# Patient Record
Sex: MALE | Race: WHITE | NOT HISPANIC OR LATINO | Employment: FULL TIME | URBAN - METROPOLITAN AREA
[De-identification: names, ages, dates, MRNs, and addresses within clinical notes are randomized per-mention and may not be internally consistent; named-entity substitution may affect disease eponyms.]

---

## 2024-02-19 ENCOUNTER — HOSPITAL ENCOUNTER (INPATIENT)
Facility: HOSPITAL | Age: 64
LOS: 3 days | Discharge: HOME | End: 2024-02-22
Attending: EMERGENCY MEDICINE | Admitting: INTERNAL MEDICINE
Payer: COMMERCIAL

## 2024-02-19 ENCOUNTER — APPOINTMENT (OUTPATIENT)
Dept: GENERAL RADIOLOGY | Facility: HOSPITAL | Age: 64
End: 2024-02-19
Payer: COMMERCIAL

## 2024-02-19 ENCOUNTER — APPOINTMENT (OUTPATIENT)
Dept: CT IMAGING | Facility: HOSPITAL | Age: 64
End: 2024-02-19
Payer: COMMERCIAL

## 2024-02-19 DIAGNOSIS — I21.4 NSTEMI (NON-ST ELEVATED MYOCARDIAL INFARCTION): Primary | ICD-10-CM

## 2024-02-19 PROBLEM — I20.0 UNSTABLE ANGINA: Status: ACTIVE | Noted: 2024-02-19

## 2024-02-19 PROBLEM — R07.9 CHEST PAIN: Status: ACTIVE | Noted: 2024-02-19

## 2024-02-19 PROBLEM — R73.9 HYPERGLYCEMIA: Status: ACTIVE | Noted: 2024-02-19

## 2024-02-19 PROBLEM — I10 HYPERTENSION: Status: ACTIVE | Noted: 2024-02-19

## 2024-02-19 PROBLEM — E87.1 HYPONATREMIA: Status: ACTIVE | Noted: 2024-02-19

## 2024-02-19 PROBLEM — E78.5 HYPERLIPIDEMIA: Status: ACTIVE | Noted: 2024-02-19

## 2024-02-19 LAB
ALBUMIN SERPL-MCNC: 4.5 G/DL (ref 3.5–5.2)
ALBUMIN/GLOB SERPL: 1.3 G/DL
ALP SERPL-CCNC: 81 U/L (ref 39–117)
ALT SERPL W P-5'-P-CCNC: 35 U/L (ref 1–41)
ANION GAP SERPL CALCULATED.3IONS-SCNC: 12 MMOL/L (ref 5–15)
APTT PPP: 30.7 SECONDS (ref 60–90)
AST SERPL-CCNC: 40 U/L (ref 1–40)
BASOPHILS # BLD AUTO: 0.04 10*3/MM3 (ref 0–0.2)
BASOPHILS NFR BLD AUTO: 0.5 % (ref 0–1.5)
BILIRUB SERPL-MCNC: 1.7 MG/DL (ref 0–1.2)
BUN SERPL-MCNC: 13 MG/DL (ref 8–23)
BUN/CREAT SERPL: 14.9 (ref 7–25)
CALCIUM SPEC-SCNC: 9.7 MG/DL (ref 8.6–10.5)
CHLORIDE SERPL-SCNC: 99 MMOL/L (ref 98–107)
CO2 SERPL-SCNC: 23 MMOL/L (ref 22–29)
CREAT SERPL-MCNC: 0.87 MG/DL (ref 0.76–1.27)
D DIMER PPP FEU-MCNC: <0.27 MCGFEU/ML (ref 0–0.63)
D-LACTATE SERPL-SCNC: 1.5 MMOL/L (ref 0.5–2)
DEPRECATED RDW RBC AUTO: 40.2 FL (ref 37–54)
EGFRCR SERPLBLD CKD-EPI 2021: 97 ML/MIN/1.73
EOSINOPHIL # BLD AUTO: 0.11 10*3/MM3 (ref 0–0.4)
EOSINOPHIL NFR BLD AUTO: 1.3 % (ref 0.3–6.2)
ERYTHROCYTE [DISTWIDTH] IN BLOOD BY AUTOMATED COUNT: 12.8 % (ref 12.3–15.4)
GEN 5 2HR TROPONIN T REFLEX: 1331 NG/L
GLOBULIN UR ELPH-MCNC: 3.4 GM/DL
GLUCOSE SERPL-MCNC: 136 MG/DL (ref 65–99)
HCT VFR BLD AUTO: 52.6 % (ref 37.5–51)
HGB BLD-MCNC: 18.5 G/DL (ref 13–17.7)
HOLD SPECIMEN: NORMAL
IMM GRANULOCYTES # BLD AUTO: 0.04 10*3/MM3 (ref 0–0.05)
IMM GRANULOCYTES NFR BLD AUTO: 0.5 % (ref 0–0.5)
INR PPP: 1.04 (ref 0.89–1.12)
LIPASE SERPL-CCNC: 20 U/L (ref 13–60)
LYMPHOCYTES # BLD AUTO: 1.98 10*3/MM3 (ref 0.7–3.1)
LYMPHOCYTES NFR BLD AUTO: 23.9 % (ref 19.6–45.3)
MCH RBC QN AUTO: 30.5 PG (ref 26.6–33)
MCHC RBC AUTO-ENTMCNC: 35.2 G/DL (ref 31.5–35.7)
MCV RBC AUTO: 86.7 FL (ref 79–97)
MONOCYTES # BLD AUTO: 0.75 10*3/MM3 (ref 0.1–0.9)
MONOCYTES NFR BLD AUTO: 9.1 % (ref 5–12)
NEUTROPHILS NFR BLD AUTO: 5.36 10*3/MM3 (ref 1.7–7)
NEUTROPHILS NFR BLD AUTO: 64.7 % (ref 42.7–76)
NRBC BLD AUTO-RTO: 0 /100 WBC (ref 0–0.2)
NT-PROBNP SERPL-MCNC: 788.3 PG/ML (ref 0–900)
PLATELET # BLD AUTO: 269 10*3/MM3 (ref 140–450)
PMV BLD AUTO: 10.1 FL (ref 6–12)
POTASSIUM SERPL-SCNC: 3.5 MMOL/L (ref 3.5–5.2)
PROCALCITONIN SERPL-MCNC: 0.11 NG/ML (ref 0–0.25)
PROT SERPL-MCNC: 7.9 G/DL (ref 6–8.5)
PROTHROMBIN TIME: 13.7 SECONDS (ref 12.2–14.5)
RBC # BLD AUTO: 6.07 10*6/MM3 (ref 4.14–5.8)
SODIUM SERPL-SCNC: 134 MMOL/L (ref 136–145)
TROPONIN T DELTA: 111 NG/L
TROPONIN T SERPL HS-MCNC: 1220 NG/L
UFH PPP CHRO-ACNC: 0.1 IU/ML (ref 0.3–0.7)
WBC NRBC COR # BLD AUTO: 8.28 10*3/MM3 (ref 3.4–10.8)
WHOLE BLOOD HOLD COAG: NORMAL
WHOLE BLOOD HOLD SPECIMEN: NORMAL

## 2024-02-19 PROCEDURE — 83690 ASSAY OF LIPASE: CPT | Performed by: EMERGENCY MEDICINE

## 2024-02-19 PROCEDURE — 85025 COMPLETE CBC W/AUTO DIFF WBC: CPT

## 2024-02-19 PROCEDURE — 83880 ASSAY OF NATRIURETIC PEPTIDE: CPT | Performed by: EMERGENCY MEDICINE

## 2024-02-19 PROCEDURE — 25010000002 HEPARIN (PORCINE) 25000-0.45 UT/250ML-% SOLUTION: Performed by: EMERGENCY MEDICINE

## 2024-02-19 PROCEDURE — 85379 FIBRIN DEGRADATION QUANT: CPT | Performed by: EMERGENCY MEDICINE

## 2024-02-19 PROCEDURE — 93010 ELECTROCARDIOGRAM REPORT: CPT | Performed by: INTERNAL MEDICINE

## 2024-02-19 PROCEDURE — 84484 ASSAY OF TROPONIN QUANT: CPT | Performed by: EMERGENCY MEDICINE

## 2024-02-19 PROCEDURE — 25010000002 MORPHINE PER 10 MG: Performed by: NURSE PRACTITIONER

## 2024-02-19 PROCEDURE — 93005 ELECTROCARDIOGRAM TRACING: CPT | Performed by: NURSE PRACTITIONER

## 2024-02-19 PROCEDURE — G0378 HOSPITAL OBSERVATION PER HR: HCPCS

## 2024-02-19 PROCEDURE — 83605 ASSAY OF LACTIC ACID: CPT | Performed by: INTERNAL MEDICINE

## 2024-02-19 PROCEDURE — 85520 HEPARIN ASSAY: CPT | Performed by: EMERGENCY MEDICINE

## 2024-02-19 PROCEDURE — 93005 ELECTROCARDIOGRAM TRACING: CPT | Performed by: EMERGENCY MEDICINE

## 2024-02-19 PROCEDURE — 85610 PROTHROMBIN TIME: CPT | Performed by: EMERGENCY MEDICINE

## 2024-02-19 PROCEDURE — 25010000002 HEPARIN (PORCINE) PER 1000 UNITS: Performed by: EMERGENCY MEDICINE

## 2024-02-19 PROCEDURE — 99223 1ST HOSP IP/OBS HIGH 75: CPT | Performed by: INTERNAL MEDICINE

## 2024-02-19 PROCEDURE — 25010000002 NITROGLYCERIN 200 MCG/ML SOLUTION: Performed by: INTERNAL MEDICINE

## 2024-02-19 PROCEDURE — 84145 PROCALCITONIN (PCT): CPT | Performed by: INTERNAL MEDICINE

## 2024-02-19 PROCEDURE — 93005 ELECTROCARDIOGRAM TRACING: CPT

## 2024-02-19 PROCEDURE — 25810000003 SODIUM CHLORIDE 0.9 % SOLUTION: Performed by: NURSE PRACTITIONER

## 2024-02-19 PROCEDURE — 71250 CT THORAX DX C-: CPT

## 2024-02-19 PROCEDURE — 85730 THROMBOPLASTIN TIME PARTIAL: CPT | Performed by: EMERGENCY MEDICINE

## 2024-02-19 PROCEDURE — 80053 COMPREHEN METABOLIC PANEL: CPT | Performed by: EMERGENCY MEDICINE

## 2024-02-19 PROCEDURE — 99291 CRITICAL CARE FIRST HOUR: CPT

## 2024-02-19 PROCEDURE — 71045 X-RAY EXAM CHEST 1 VIEW: CPT

## 2024-02-19 RX ORDER — BISACODYL 10 MG
10 SUPPOSITORY, RECTAL RECTAL DAILY PRN
Status: DISCONTINUED | OUTPATIENT
Start: 2024-02-19 | End: 2024-02-22 | Stop reason: HOSPADM

## 2024-02-19 RX ORDER — BISACODYL 5 MG/1
5 TABLET, DELAYED RELEASE ORAL DAILY PRN
Status: DISCONTINUED | OUTPATIENT
Start: 2024-02-19 | End: 2024-02-22 | Stop reason: HOSPADM

## 2024-02-19 RX ORDER — HEPARIN SODIUM 1000 [USP'U]/ML
4000 INJECTION, SOLUTION INTRAVENOUS; SUBCUTANEOUS AS NEEDED
Status: DISCONTINUED | OUTPATIENT
Start: 2024-02-19 | End: 2024-02-19

## 2024-02-19 RX ORDER — MORPHINE SULFATE 2 MG/ML
1 INJECTION, SOLUTION INTRAMUSCULAR; INTRAVENOUS EVERY 4 HOURS PRN
Status: DISCONTINUED | OUTPATIENT
Start: 2024-02-19 | End: 2024-02-20

## 2024-02-19 RX ORDER — LIDOCAINE HYDROCHLORIDE 20 MG/ML
5 SOLUTION OROPHARYNGEAL ONCE
Status: COMPLETED | OUTPATIENT
Start: 2024-02-19 | End: 2024-02-19

## 2024-02-19 RX ORDER — SODIUM CHLORIDE 0.9 % (FLUSH) 0.9 %
10 SYRINGE (ML) INJECTION AS NEEDED
Status: DISCONTINUED | OUTPATIENT
Start: 2024-02-19 | End: 2024-02-22 | Stop reason: HOSPADM

## 2024-02-19 RX ORDER — ACETAMINOPHEN 325 MG/1
650 TABLET ORAL EVERY 4 HOURS PRN
Status: DISCONTINUED | OUTPATIENT
Start: 2024-02-19 | End: 2024-02-21 | Stop reason: SDUPTHER

## 2024-02-19 RX ORDER — ACETAMINOPHEN 650 MG/1
650 SUPPOSITORY RECTAL EVERY 4 HOURS PRN
Status: DISCONTINUED | OUTPATIENT
Start: 2024-02-19 | End: 2024-02-21 | Stop reason: SDUPTHER

## 2024-02-19 RX ORDER — NITROGLYCERIN 0.4 MG/1
0.4 TABLET SUBLINGUAL
Status: DISCONTINUED | OUTPATIENT
Start: 2024-02-19 | End: 2024-02-19

## 2024-02-19 RX ORDER — ACETAMINOPHEN 160 MG/5ML
650 SOLUTION ORAL EVERY 4 HOURS PRN
Status: DISCONTINUED | OUTPATIENT
Start: 2024-02-19 | End: 2024-02-21 | Stop reason: SDUPTHER

## 2024-02-19 RX ORDER — POTASSIUM CHLORIDE 20 MEQ/1
40 TABLET, EXTENDED RELEASE ORAL EVERY 4 HOURS
Status: DISCONTINUED | OUTPATIENT
Start: 2024-02-19 | End: 2024-02-20

## 2024-02-19 RX ORDER — HEPARIN SODIUM 10000 [USP'U]/100ML
16 INJECTION, SOLUTION INTRAVENOUS
Status: DISCONTINUED | OUTPATIENT
Start: 2024-02-19 | End: 2024-02-20

## 2024-02-19 RX ORDER — ASPIRIN 81 MG/1
324 TABLET, CHEWABLE ORAL ONCE
Status: DISCONTINUED | OUTPATIENT
Start: 2024-02-19 | End: 2024-02-19 | Stop reason: SDUPTHER

## 2024-02-19 RX ORDER — NITROGLYCERIN 20 MG/100ML
5-200 INJECTION INTRAVENOUS
Status: DISCONTINUED | OUTPATIENT
Start: 2024-02-19 | End: 2024-02-21

## 2024-02-19 RX ORDER — ATORVASTATIN CALCIUM 40 MG/1
40 TABLET, FILM COATED ORAL NIGHTLY
Status: DISCONTINUED | OUTPATIENT
Start: 2024-02-19 | End: 2024-02-22 | Stop reason: HOSPADM

## 2024-02-19 RX ORDER — POLYETHYLENE GLYCOL 3350 17 G/17G
17 POWDER, FOR SOLUTION ORAL DAILY PRN
Status: DISCONTINUED | OUTPATIENT
Start: 2024-02-19 | End: 2024-02-22 | Stop reason: HOSPADM

## 2024-02-19 RX ORDER — AMOXICILLIN 250 MG
2 CAPSULE ORAL 2 TIMES DAILY PRN
Status: DISCONTINUED | OUTPATIENT
Start: 2024-02-19 | End: 2024-02-22 | Stop reason: HOSPADM

## 2024-02-19 RX ORDER — PANTOPRAZOLE SODIUM 40 MG/10ML
40 INJECTION, POWDER, LYOPHILIZED, FOR SOLUTION INTRAVENOUS EVERY 12 HOURS SCHEDULED
Status: DISCONTINUED | OUTPATIENT
Start: 2024-02-19 | End: 2024-02-22 | Stop reason: ALTCHOICE

## 2024-02-19 RX ORDER — SODIUM CHLORIDE 9 MG/ML
75 INJECTION, SOLUTION INTRAVENOUS CONTINUOUS
Status: DISCONTINUED | OUTPATIENT
Start: 2024-02-19 | End: 2024-02-20

## 2024-02-19 RX ORDER — SODIUM CHLORIDE 0.9 % (FLUSH) 0.9 %
10 SYRINGE (ML) INJECTION EVERY 12 HOURS SCHEDULED
Status: DISCONTINUED | OUTPATIENT
Start: 2024-02-19 | End: 2024-02-22 | Stop reason: HOSPADM

## 2024-02-19 RX ORDER — CHOLECALCIFEROL (VITAMIN D3) 125 MCG
10 CAPSULE ORAL NIGHTLY PRN
Status: DISCONTINUED | OUTPATIENT
Start: 2024-02-19 | End: 2024-02-22 | Stop reason: HOSPADM

## 2024-02-19 RX ORDER — SODIUM CHLORIDE 9 MG/ML
40 INJECTION, SOLUTION INTRAVENOUS AS NEEDED
Status: DISCONTINUED | OUTPATIENT
Start: 2024-02-19 | End: 2024-02-22 | Stop reason: HOSPADM

## 2024-02-19 RX ORDER — ASPIRIN 81 MG/1
324 TABLET, CHEWABLE ORAL ONCE
Status: DISCONTINUED | OUTPATIENT
Start: 2024-02-19 | End: 2024-02-22 | Stop reason: HOSPADM

## 2024-02-19 RX ORDER — NALOXONE HCL 0.4 MG/ML
0.4 VIAL (ML) INJECTION
Status: DISCONTINUED | OUTPATIENT
Start: 2024-02-19 | End: 2024-02-22 | Stop reason: HOSPADM

## 2024-02-19 RX ORDER — HEPARIN SODIUM 1000 [USP'U]/ML
2000 INJECTION, SOLUTION INTRAVENOUS; SUBCUTANEOUS AS NEEDED
Status: DISCONTINUED | OUTPATIENT
Start: 2024-02-19 | End: 2024-02-19

## 2024-02-19 RX ORDER — HEPARIN SODIUM 1000 [USP'U]/ML
4000 INJECTION, SOLUTION INTRAVENOUS; SUBCUTANEOUS ONCE
Qty: 10 ML | Refills: 0 | Status: COMPLETED | OUTPATIENT
Start: 2024-02-19 | End: 2024-02-19

## 2024-02-19 RX ADMIN — HEPARIN SODIUM 4000 UNITS: 1000 INJECTION INTRAVENOUS; SUBCUTANEOUS at 17:58

## 2024-02-19 RX ADMIN — NITROGLYCERIN 0.4 MG: 0.4 TABLET SUBLINGUAL at 20:55

## 2024-02-19 RX ADMIN — Medication 10 ML: at 22:33

## 2024-02-19 RX ADMIN — POTASSIUM CHLORIDE 40 MEQ: 1500 TABLET, EXTENDED RELEASE ORAL at 22:37

## 2024-02-19 RX ADMIN — HEPARIN SODIUM 11 UNITS/KG/HR: 10000 INJECTION, SOLUTION INTRAVENOUS at 17:58

## 2024-02-19 RX ADMIN — NITROGLYCERIN 10 MCG/MIN: 20 INJECTION INTRAVENOUS at 22:10

## 2024-02-19 RX ADMIN — LIDOCAINE HYDROCHLORIDE 5 ML: 20 SOLUTION ORAL at 22:37

## 2024-02-19 RX ADMIN — SODIUM CHLORIDE 75 ML/HR: 9 INJECTION, SOLUTION INTRAVENOUS at 21:33

## 2024-02-19 RX ADMIN — ATORVASTATIN CALCIUM 40 MG: 40 TABLET, FILM COATED ORAL at 22:37

## 2024-02-19 RX ADMIN — NITROGLYCERIN 0.4 MG: 0.4 TABLET SUBLINGUAL at 20:59

## 2024-02-19 RX ADMIN — NITROGLYCERIN 1 INCH: 20 OINTMENT TOPICAL at 21:18

## 2024-02-19 RX ADMIN — MORPHINE SULFATE 1 MG: 2 INJECTION, SOLUTION INTRAMUSCULAR; INTRAVENOUS at 21:19

## 2024-02-19 RX ADMIN — PANTOPRAZOLE SODIUM 40 MG: 40 INJECTION, POWDER, FOR SOLUTION INTRAVENOUS at 22:37

## 2024-02-19 RX ADMIN — NITROGLYCERIN 0.4 MG: 0.4 TABLET SUBLINGUAL at 20:49

## 2024-02-19 NOTE — Clinical Note
Hemostasis started on the right radial artery. R-Band was used in achieving hemostasis. Radial compression device applied to vessel. Hemostasis achieved successfully. Closure device additional comment: 12cc

## 2024-02-19 NOTE — Clinical Note
Hemostasis started on the right radial artery. R-Band was used in achieving hemostasis. Radial compression device applied to vessel. Hemostasis achieved successfully. Closure device additional comment: 16cc air

## 2024-02-19 NOTE — ED PROVIDER NOTES
Subjective   History of Present Illness  63-year-old male who presents for evaluation of chest pain.  The patient reports that last week he had a GI bug.  He had diarrhea and he reports that he had 1 really severe episode of nausea vomiting.  After the episode he had pain in the center of his chest.  However for the most part it is not severe.  He reports that yesterday when he ate some roast the pain became more severe and he reports today when he ate some toast the pain became more severe.  The pain is in the center of the chest but he does feel pain up into his teeth, and into his bilateral arms.  He reports that the pain was 6 out of 10 when he first presented here but now it is negligible.  He denies fever or infectious symptoms.  No runny nose cough congestion sore throat.  No abdominal pain.  No change in bowel or urinary function.  He does have a history of high cholesterol and has a BMI greater than 30.  No history of high blood pressure or diabetes.  He does not smoke.  No history of coronary artery disease in a first-degree relative less than 65.  He appears well nontoxic and in no acute distress.      Review of Systems   Constitutional:  Negative for chills, fatigue and fever.   HENT:  Negative for congestion, ear pain, postnasal drip, sinus pressure and sore throat.    Eyes:  Negative for pain, redness and visual disturbance.   Respiratory:  Negative for cough, chest tightness and shortness of breath.    Cardiovascular:  Positive for chest pain. Negative for palpitations and leg swelling.   Gastrointestinal:  Negative for abdominal pain, anal bleeding, blood in stool, diarrhea, nausea and vomiting.   Endocrine: Negative for polydipsia and polyuria.   Genitourinary:  Negative for difficulty urinating, dysuria, frequency and urgency.   Musculoskeletal:  Negative for arthralgias, back pain and neck pain.   Skin:  Negative for pallor and rash.   Allergic/Immunologic: Negative for environmental allergies and  immunocompromised state.   Neurological:  Negative for dizziness, weakness and headaches.   Hematological:  Negative for adenopathy.   Psychiatric/Behavioral:  Negative for confusion, self-injury and suicidal ideas. The patient is not nervous/anxious.    All other systems reviewed and are negative.      Past Medical History:   Diagnosis Date    Hyperlipidemia     Hypertension        No Known Allergies    Past Surgical History:   Procedure Laterality Date    CARDIAC CATHETERIZATION N/A 2/20/2024    Procedure: Left Heart Cath;  Surgeon: Braeden Alcantara MD;  Location:  [facility];  Service: Cardiovascular;  Laterality: N/A;    CARDIAC CATHETERIZATION  2/20/2024    Procedure: Percutaneous Manual Thrombectomy;  Surgeon: Braeden Alcantara MD;  Location:  [facility];  Service: Cardiovascular;;    CARDIAC CATHETERIZATION N/A 2/21/2024    Procedure: Percutaneous Coronary Intervention - PCI to RCA;  Surgeon: Jerad Keller III, MD;  Location:  [facility];  Service: Cardiology;  Laterality: N/A;    EYE SURGERY      VASECTOMY         Family History   Problem Relation Age of Onset    Heart disease Mother     Thyroid disease Mother     Dementia Father        Social History     Socioeconomic History    Marital status:    Tobacco Use    Smoking status: Former     Types: Cigarettes    Smokeless tobacco: Never   Vaping Use    Vaping Use: Never used   Substance and Sexual Activity    Alcohol use: Yes     Comment: wine once a week    Drug use: Yes     Types: Marijuana     Comment: weekends    Sexual activity: Defer           Objective   Physical Exam  Vitals and nursing note reviewed.   Constitutional:       General: He is not in acute distress.     Appearance: Normal appearance. He is well-developed. He is not toxic-appearing or diaphoretic.   HENT:      Head: Normocephalic and atraumatic.      Right Ear: External ear normal.      Left Ear: External ear normal.      Nose:  Nose normal.   Eyes:      General: Lids are normal.      Pupils: Pupils are equal, round, and reactive to light.   Neck:      Trachea: No tracheal deviation.   Cardiovascular:      Rate and Rhythm: Regular rhythm. Tachycardia present.      Pulses: No decreased pulses.      Heart sounds: Normal heart sounds. No murmur heard.     No friction rub. No gallop.   Pulmonary:      Effort: Pulmonary effort is normal. No respiratory distress.      Breath sounds: Normal breath sounds. No decreased breath sounds, wheezing, rhonchi or rales.   Abdominal:      General: Bowel sounds are normal.      Palpations: Abdomen is soft.      Tenderness: There is no abdominal tenderness. There is no guarding or rebound.   Musculoskeletal:         General: No deformity. Normal range of motion.      Cervical back: Normal range of motion and neck supple.   Lymphadenopathy:      Cervical: No cervical adenopathy.   Skin:     General: Skin is warm and dry.      Findings: No rash.   Neurological:      Mental Status: He is alert and oriented to person, place, and time.      Cranial Nerves: No cranial nerve deficit.      Sensory: No sensory deficit.   Psychiatric:         Speech: Speech normal.         Behavior: Behavior normal.         Thought Content: Thought content normal.         Judgment: Judgment normal.         Critical Care    Performed by: Addison aGrcia MD  Authorized by: Addison Garcia MD    Critical care provider statement:     Critical care time (minutes):  45    Critical care time was exclusive of:  Separately billable procedures and treating other patients    Critical care was necessary to treat or prevent imminent or life-threatening deterioration of the following conditions:  Cardiac failure    Critical care was time spent personally by me on the following activities:  Development of treatment plan with patient or surrogate, discussions with primary provider, evaluation of patient's response to treatment, examination  of patient, obtaining history from patient or surrogate, ordering and review of laboratory studies, ordering and review of radiographic studies, ordering and performing treatments and interventions, re-evaluation of patient's condition, review of old charts and pulse oximetry    I assumed direction of critical care for this patient from another provider in my specialty: no      Care discussed with: admitting provider               ED Course  ED Course as of 02/23/24 1316   Mon Feb 19, 2024   1717 The patient reports having a GI bug last week.  After an episode of severe vomiting he began to develop pain in the chest.  He reports that the pain is minimal most the time but with eating yesterday and the day he had increase in pain.  The pain radiates into the jaw and into the bilateral shoulders and arms.  No history of coronary artery disease.  He does have high cholesterol and is obese.  Troponin is dramatically elevated.  Unsure if this is a secondary viral myocarditis.  EKG does show subtle ST depression in high lateral leads.  Discussed the patient with Dr. Hansen, who recommends non-STEMI medications but do not give Plavix.  He recommends n.p.o. after midnight for cath tomorrow.  The patient states that his chest pain is negligible at this given time.  He has been walking throughout the ER appearing well since arrival.  [NS]      ED Course User Index  [NS] Addison Garcia MD                                               Medical Decision Making  Differential diagnosis includes viral myocarditis, pulmonary embolism, dehydration, acute coronary syndrome, other unspecified etiology.    Troponin is significant elevated.    EKG does show subtle ST depressions in lead I and aVL.  No other significant changes.  No history of coronary artery disease.    D-dimer pending.  Chest x-ray pending.  Lungs clear to auscultation diffusely.  Normal oxygen saturations.  No history of DVT or PE.  No signs of DVT on  exam.    Troponin is significant elevated greater than 1200.    I discussed the patient's presentation with the on-call cardiologist, Dr. Mcleod, who recommends non-STEMI medications except Plavix.    Discussed the patient with the hospitalist, Dr. Correia, who will consult on the patient to determine status of admission.    Problems Addressed:  NSTEMI (non-ST elevated myocardial infarction): complicated acute illness or injury    Amount and/or Complexity of Data Reviewed  Independent Historian: spouse     Details: Wife provides additional information.  External Data Reviewed: labs, radiology and ECG.  Labs: ordered. Decision-making details documented in ED Course.  Radiology: ordered and independent interpretation performed. Decision-making details documented in ED Course.  ECG/medicine tests: ordered and independent interpretation performed. Decision-making details documented in ED Course.    Risk  OTC drugs.  Prescription drug management.  Decision regarding hospitalization.        Final diagnoses:   NSTEMI (non-ST elevated myocardial infarction)       ED Disposition  ED Disposition       ED Disposition   Decision to Admit    Condition   --    Comment   Level of Care: Telemetry [5]   Diagnosis: Chest pain [489264]   Admitting Physician: JONNY CORREIA [[id number]]   Attending Physician: JONNY CORREIA [[id number]]                 Liya Rubio, APRN  [address] [address]  [phone]      1 week         Medication List        New Prescriptions      aspirin 81 MG EC tablet  Take 1 tablet by mouth Daily.     bisoprolol 5 MG tablet  Commonly known as: ZEBeta  Take 0.5 tablets by mouth Daily.     ticagrelor 90 MG tablet tablet  Commonly known as: BRILINTA  Take 1 tablet by mouth Every 12 (Twelve) Hours.            Changed      atorvastatin 40 MG tablet  Commonly known as: LIPITOR  Take 1 tablet by mouth Every Night.  What changed:   medication strength  how much to take  when to take this                Where to Get Your Medications        These medications were sent to [id number] - [place], [place] - [address] AT [facility] [address] - [phone]  - [phone] FX  [address], [facility] [address]      Phone: [phone]   atorvastatin 40 MG tablet  bisoprolol 5 MG tablet  ticagrelor 90 MG tablet tablet       Information about where to get these medications is not yet available    Ask your nurse or doctor about these medications  aspirin 81 MG EC tablet            Addison Garcia MD  02/23/24 0084

## 2024-02-19 NOTE — Clinical Note
Blood pressure Goal of <140/90 met? yes  Labs reviewed/ordered  Low Salt Intake encouraged.   Exercise as tolerated  Continue medications  Patient is understanding of the plan  Follow-up as recommended in progress note     The right coronary artery was selectively engaged, injected and visualized.

## 2024-02-19 NOTE — Clinical Note
Level of Care: Telemetry [5]   Diagnosis: Chest pain [452252]   Admitting Physician: JONNY CORREIA [[id number]]   Attending Physician: JONNY CORREIA [[id number]]

## 2024-02-19 NOTE — Clinical Note
First balloon inflation max pressure = 14 sonal. First balloon inflation duration = 15 seconds. Second inflation of balloon - Max pressure = 14 sonal. 2nd Inflation of balloon - Duration = 14 seconds. 2nd inflation was done at 14:47 EST. The balloon is positioned in the Mid segment of the vessel. Third inflation of balloon - Max pressure = 14 sonal. 3rd Inflation of balloon - Duration = 13 seconds. 3rd inflation was done at 14:48 EST. The bal loon is positioned in the Distal segment of the vessel.

## 2024-02-19 NOTE — ED NOTES
" Taqueria Madden    Nursing Report ED to Floor:  Mental status: A&Ox4  Ambulatory status: up with standby  Oxygen Therapy:  room air  Cardiac Rhythm: normal sinus  Admitted from: home  Safety Concerns:  none  Social Issues: none  ED Room #:  23    ED Nurse Phone Extension - [phone] or may call [phone].      HPI:   Chief Complaint   Patient presents with    Chest Pain       Past Medical History:  Past Medical History:   Diagnosis Date    Hyperlipidemia     Hypertension         Past Surgical History:  No past surgical history on file.     Admitting Doctor:   Glendy Cisse MD    Consulting Provider(s):  Consults       No orders found from 1/21/2024 to 2/20/2024.             Admitting Diagnosis:   The encounter diagnosis was NSTEMI (non-ST elevated myocardial infarction).    Most Recent Vitals:   Vitals:    02/19/24 1547   BP: (!) 175/121   BP Location: Right arm   Patient Position: Sitting   Pulse: 110   Resp: 20   Temp: 98.2 °F (36.8 °C)   TempSrc: Oral   SpO2: 96%   Weight: 95.3 kg (210 lb)   Height: 177.8 cm (70\")       Active LDAs/IV Access:   Lines, Drains & Airways       Active LDAs       Name Placement date Placement time Site Days    Peripheral IV 02/19/24 1608 Anterior;Left;Proximal Forearm 02/19/24  1608  Forearm  less than 1                    Labs (abnormal labs have a star):   Labs Reviewed   TROPONIN - Abnormal; Notable for the following components:       Result Value    HS Troponin T 1,220 (*)     All other components within normal limits    Narrative:     High Sensitive Troponin T Reference Range:  <14.0 ng/L- Negative Female for AMI  <22.0 ng/L- Negative Male for AMI  >=14 - Abnormal Female indicating possible myocardial injury.  >=22 - Abnormal Male indicating possible myocardial injury.   Clinicians would have to utilize clinical acumen, EKG, Troponin, and serial changes to determine if it is an Acute Myocardial Infarction or myocardial injury due to an underlying chronic condition.        COMPREHENSIVE " METABOLIC PANEL - Abnormal; Notable for the following components:    Glucose 136 (*)     Sodium 134 (*)     Total Bilirubin 1.7 (*)     All other components within normal limits    Narrative:     GFR Normal >60  Chronic Kidney Disease <60  Kidney Failure <15     CBC WITH AUTO DIFFERENTIAL - Abnormal; Notable for the following components:    RBC 6.07 (*)     Hemoglobin 18.5 (*)     Hematocrit 52.6 (*)     All other components within normal limits   HEPARIN ANTI XA - Abnormal; Notable for the following components:    Heparin Anti-Xa (UFH) 0.10 (*)     All other components within normal limits   APTT - Abnormal; Notable for the following components:    PTT 30.7 (*)     All other components within normal limits    Narrative:     PTT = The equivalent PTT values for the therapeutic range of heparin levels at 0.3 to 0.5 U/ml are 60 to 70 seconds.   LIPASE - Normal   BNP (IN-HOUSE) - Normal    Narrative:     This assay is used as an aid in the diagnosis of individuals suspected of having heart failure. It can be used as an aid in the diagnosis of acute decompensated heart failure (ADHF) in patients presenting with signs and symptoms of ADHF to the emergency department (ED). In addition, NT-proBNP of <300 pg/mL indicates ADHF is not likely.    Age Range Result Interpretation  NT-proBNP Concentration (pg/mL:      <50             Positive            >450                   Gray                 300-450                    Negative             <300    50-75           Positive            >900                  Gray                300-900                  Negative            <300      >75             Positive            >1800                  Gray                300-1800                  Negative            <300   PROTIME-INR - Normal   D-DIMER, QUANTITATIVE - Normal    Narrative:     According to the assay 's published package insert, a normal (<0.50 MCGFEU/mL) D-dimer result in conjunction with a non-high clinical  "probability assessment, excludes deep vein thrombosis (DVT) and pulmonary embolism (PE) with high sensitivity.    D-dimer values increase with age and this can make VTE exclusion of an older population difficult. To address this, the American College [employer], based on best available evidence and recent guidelines, recommends that clinicians use age-adjusted D-dimer thresholds in patients greater than 50 years of age with: a) a low probability of PE who do not meet all Pulmonary Embolism Rule Out Criteria, or b) in those with intermediate probability of PE.   The formula for an age-adjusted D-dimer cut-off is \"age/100\".  For example, a 60 year old patient would have an age-adjusted cut-off of 0.60 MCGFEU/mL and an 80 year old 0.80 MCGFEU/mL.   LACTIC ACID, PLASMA - Normal   PROCALCITONIN - Normal    Narrative:     As a Marker for Sepsis (Non-Neonates):    1. <0.5 ng/mL represents a low risk of severe sepsis and/or septic shock.  2. >2 ng/mL represents a high risk of severe sepsis and/or septic shock.    As a Marker for Lower Respiratory Tract Infections that require antibiotic therapy:    PCT on Admission    Antibiotic Therapy       6-12 Hrs later    >0.5                Strongly Recommended  >0.25 - <0.5        Recommended   0.1 - 0.25          Discouraged              Remeasure/reassess PCT  <0.1                Strongly Discouraged     Remeasure/reassess PCT    As 28 day mortality risk marker: \"Change in Procalcitonin Result\" (>80% or <=80%) if Day 0 (or Day 1) and Day 4 values are available. Refer to http://www.[facility]-pct-calculator.com    Change in PCT <=80%  A decrease of PCT levels below or equal to 80% defines a positive change in PCT test result representing a higher risk for 28-day all-cause mortality of patients diagnosed with severe sepsis for septic shock.    Change in PCT >80%  A decrease of PCT levels of more than 80% defines a negative change in PCT result representing a lower risk for 28-day " all-cause mortality of patients diagnosed with severe sepsis or septic shock.      RAINBOW DRAW    Narrative:     The following orders were created for panel order [place] Draw.  Procedure                               Abnormality         Status                     ---------                               -----------         ------                     Green Top (Gel)[[id number]]                                  Final result               Lavender Top[[id number]]                                     Final result               Gold Top - SST[[id number]]                                   Final result               Pruett Top[[id number]]                                         In process                 Light Blue Top[[id number]]                                   Final result                 Please view results for these tests on the individual orders.   HIGH SENSITIVITIY TROPONIN T 2HR   HEPARIN ANTI XA   CBC AND DIFFERENTIAL    Narrative:     The following orders were created for panel order CBC & Differential.  Procedure                               Abnormality         Status                     ---------                               -----------         ------                     CBC Auto Differential[[id number]]        Abnormal            Final result                 Please view results for these tests on the individual orders.   GREEN TOP   LAVENDER TOP   GOLD TOP - SST   LIGHT BLUE TOP   GRAY TOP       Meds Given in ED:   Medications   sodium chloride 0.9 % flush 10 mL (has no administration in time range)   sodium chloride 0.9 % bolus 1,000 mL (has no administration in time range)   heparin 98124 units/250 mL (100 units/mL) in 0.45 % NaCl infusion (11 Units/kg/hr × 95.3 kg Intravenous New Bag 2/19/24 8465)   Pharmacy to Dose Heparin (has no administration in time range)   aspirin chewable tablet 324 mg (has no administration in time range)   nitroglycerin (NITROSTAT) ointment 1 inch (has no administration in time range)    heparin (porcine) injection 4,000 Units (4,000 Units Intravenous Given 2/19/24 1758)     heparin, 11 Units/kg/hr, Last Rate: 11 Units/kg/hr (02/19/24 1758)  Pharmacy to Dose Heparin,

## 2024-02-19 NOTE — PROGRESS NOTES
HEPARIN INFUSION  Taqueria Madden is a  63 y.o. male receiving heparin infusion.     Therapy for (VTE/Cardiac):   Cardiac Protocol - ACS (Unstable Angina, Myocarditis)                  Continue with full-dose anticoagulation until Cath Lab tomorrow (2/20/24)  Patient Weight: 95.3 kg  Initial Bolus (Y/N):   Yes  Any Bolus (Y/N):   Yes        Signs or Symptoms of Bleeding: No S/Sx bleeding per RN    Cardiac or Other (Not VTE)   Initial Bolus: 60 units/kg (Max 4,000 units)  Initial rate: 12 units/kg/hr (Max 1,000 units/hr)   Anti Xa Rebolus Infusion Hold time Change infusion Dose (Units/kg/hr) Next Anti Xa or aPTT Level Due   < 0.11 50 Units/kg  (4000 Units Max) None Increase by  3 Units/kg/hr 6 hours   0.11- 0.19 25 Units/kg  (2000 Units Max) None Increase by  2 Units/kg/hr 6 hours   0.2 - 0.29 0 None Increase by  1 Units/kg/hr 6 hours   0.3 - 0.5 0 None No Change 6 hours (after 2 consecutive levels in range check qAM)   0.51 - 0.6 0 None Decrease by  1 Units/kg/hr 6 hours   0.61 - 0.8 0 30 Minutes Decrease by  2 Units/kg/hr 6 hours   0.81 - 1 0 60 Minutes Decrease by  3 Units/kg/hr 6 hours   >1 0 Hold  After Anti Xa less than 0.5 decrease previous rate by  4 Units/kg/hr  Every 2 hours until Anti Xa  less than 0.5 then when infusion restarts in 6 hours     Recommend Xa every 6 hours.     Results from last 7 days   Lab Units 02/19/24  1603   INR  1.04   HEMOGLOBIN g/dL 18.5*   HEMATOCRIT % 52.6*   PLATELETS 10*3/mm3 269          Date   Time   Anti-Xa Current Rate (Unit/kg/hr) Bolus   (Units) Rate Change   (Unit/kg/hr) New Rate (Unit/kg/hr) Next   Anti-Xa Comments  Pump Check Daily   2/19 16:03 0.10 ---new--- 4000  +11 11 00:00 Set up IV pump w/ RN. TBW, bolus, rate confirmed.                                                                                                                                                                                                                                  Woo Lopez, PharmD,  BCPS, [facility]  17:49 EST   02/19/24

## 2024-02-20 ENCOUNTER — APPOINTMENT (OUTPATIENT)
Dept: CARDIOLOGY | Facility: HOSPITAL | Age: 64
End: 2024-02-20
Payer: COMMERCIAL

## 2024-02-20 LAB
ANION GAP SERPL CALCULATED.3IONS-SCNC: 12 MMOL/L (ref 5–15)
BASOPHILS # BLD AUTO: 0.04 10*3/MM3 (ref 0–0.2)
BASOPHILS NFR BLD AUTO: 0.5 % (ref 0–1.5)
BH CV ECHO MEAS - AO MAX PG: 8.2 MMHG
BH CV ECHO MEAS - AO MEAN PG: 4 MMHG
BH CV ECHO MEAS - AO ROOT DIAM: 3.4 CM
BH CV ECHO MEAS - AO V2 MAX: 143 CM/SEC
BH CV ECHO MEAS - AO V2 VTI: 23.7 CM
BH CV ECHO MEAS - AVA(I,D): 2.5 CM2
BH CV ECHO MEAS - EDV(CUBED): 46.7 ML
BH CV ECHO MEAS - EDV(MOD-SP2): 90.1 ML
BH CV ECHO MEAS - EDV(MOD-SP4): 99.9 ML
BH CV ECHO MEAS - EF(MOD-BP): 60.2 %
BH CV ECHO MEAS - EF(MOD-SP2): 60.3 %
BH CV ECHO MEAS - EF(MOD-SP4): 56.5 %
BH CV ECHO MEAS - ESV(CUBED): 19.7 ML
BH CV ECHO MEAS - ESV(MOD-SP2): 35.8 ML
BH CV ECHO MEAS - ESV(MOD-SP4): 43.5 ML
BH CV ECHO MEAS - FS: 25 %
BH CV ECHO MEAS - IVS/LVPW: 1 CM
BH CV ECHO MEAS - IVSD: 1.2 CM
BH CV ECHO MEAS - LA DIMENSION: 3.4 CM
BH CV ECHO MEAS - LAT PEAK E' VEL: 9.6 CM/SEC
BH CV ECHO MEAS - LV MASS(C)D: 141.5 GRAMS
BH CV ECHO MEAS - LV MAX PG: 4.4 MMHG
BH CV ECHO MEAS - LV MEAN PG: 2 MMHG
BH CV ECHO MEAS - LV V1 MAX: 105 CM/SEC
BH CV ECHO MEAS - LV V1 VTI: 19 CM
BH CV ECHO MEAS - LVIDD: 3.6 CM
BH CV ECHO MEAS - LVIDS: 2.7 CM
BH CV ECHO MEAS - LVOT AREA: 3.1 CM2
BH CV ECHO MEAS - LVOT DIAM: 2 CM
BH CV ECHO MEAS - LVPWD: 1.2 CM
BH CV ECHO MEAS - MED PEAK E' VEL: 7.3 CM/SEC
BH CV ECHO MEAS - MV A MAX VEL: 73.9 CM/SEC
BH CV ECHO MEAS - MV DEC TIME: 0.2 SEC
BH CV ECHO MEAS - MV E MAX VEL: 51.4 CM/SEC
BH CV ECHO MEAS - MV E/A: 0.7
BH CV ECHO MEAS - PA ACC TIME: 0.12 SEC
BH CV ECHO MEAS - PA V2 MAX: 80.2 CM/SEC
BH CV ECHO MEAS - RAP SYSTOLE: 3 MMHG
BH CV ECHO MEAS - RVSP: 19 MMHG
BH CV ECHO MEAS - SV(LVOT): 59.7 ML
BH CV ECHO MEAS - SV(MOD-SP2): 54.3 ML
BH CV ECHO MEAS - SV(MOD-SP4): 56.4 ML
BH CV ECHO MEAS - TAPSE (>1.6): 1.78 CM
BH CV ECHO MEAS - TR MAX PG: 16 MMHG
BH CV ECHO MEAS - TR MAX VEL: 200 CM/SEC
BH CV ECHO MEASUREMENTS AVERAGE E/E' RATIO: 6.08
BH CV VAS BP LEFT ARM: NORMAL MMHG
BH CV XLRA - RV BASE: 3.7 CM
BH CV XLRA - RV LENGTH: 5.8 CM
BH CV XLRA - RV MID: 2.6 CM
BH CV XLRA - TDI S': 9.9 CM/SEC
BUN SERPL-MCNC: 13 MG/DL (ref 8–23)
BUN/CREAT SERPL: 15.7 (ref 7–25)
CALCIUM SPEC-SCNC: 8.5 MG/DL (ref 8.6–10.5)
CATH EF ESTIMATED: 55 %
CHLORIDE SERPL-SCNC: 105 MMOL/L (ref 98–107)
CHOLEST SERPL-MCNC: 137 MG/DL (ref 0–200)
CO2 SERPL-SCNC: 21 MMOL/L (ref 22–29)
CREAT SERPL-MCNC: 0.83 MG/DL (ref 0.76–1.27)
DEPRECATED RDW RBC AUTO: 39.9 FL (ref 37–54)
EGFRCR SERPLBLD CKD-EPI 2021: 98.3 ML/MIN/1.73
EOSINOPHIL # BLD AUTO: 0.07 10*3/MM3 (ref 0–0.4)
EOSINOPHIL NFR BLD AUTO: 0.9 % (ref 0.3–6.2)
ERYTHROCYTE [DISTWIDTH] IN BLOOD BY AUTOMATED COUNT: 12.7 % (ref 12.3–15.4)
GLUCOSE SERPL-MCNC: 117 MG/DL (ref 65–99)
HBA1C MFR BLD: 5.6 % (ref 4.8–5.6)
HCT VFR BLD AUTO: 46.8 % (ref 37.5–51)
HDLC SERPL-MCNC: 30 MG/DL (ref 40–60)
HGB BLD-MCNC: 15.6 G/DL (ref 13–17.7)
IMM GRANULOCYTES # BLD AUTO: 0.04 10*3/MM3 (ref 0–0.05)
IMM GRANULOCYTES NFR BLD AUTO: 0.5 % (ref 0–0.5)
LDLC SERPL CALC-MCNC: 88 MG/DL (ref 0–100)
LDLC/HDLC SERPL: 2.9 {RATIO}
LEFT ATRIUM VOLUME INDEX: 22.5 ML/M2
LV EF 2D ECHO EST: 60 %
LYMPHOCYTES # BLD AUTO: 2.25 10*3/MM3 (ref 0.7–3.1)
LYMPHOCYTES NFR BLD AUTO: 28.6 % (ref 19.6–45.3)
MAGNESIUM SERPL-MCNC: 2.2 MG/DL (ref 1.6–2.4)
MCH RBC QN AUTO: 28.9 PG (ref 26.6–33)
MCHC RBC AUTO-ENTMCNC: 33.3 G/DL (ref 31.5–35.7)
MCV RBC AUTO: 86.7 FL (ref 79–97)
MONOCYTES # BLD AUTO: 0.74 10*3/MM3 (ref 0.1–0.9)
MONOCYTES NFR BLD AUTO: 9.4 % (ref 5–12)
NEUTROPHILS NFR BLD AUTO: 4.74 10*3/MM3 (ref 1.7–7)
NEUTROPHILS NFR BLD AUTO: 60.1 % (ref 42.7–76)
NRBC BLD AUTO-RTO: 0 /100 WBC (ref 0–0.2)
OSMOLALITY UR: 942 MOSM/KG (ref 300–1100)
PLATELET # BLD AUTO: 240 10*3/MM3 (ref 140–450)
PMV BLD AUTO: 9.9 FL (ref 6–12)
POTASSIUM SERPL-SCNC: 4 MMOL/L (ref 3.5–5.2)
QT INTERVAL: 310 MS
QT INTERVAL: 320 MS
QT INTERVAL: 332 MS
QT INTERVAL: 350 MS
QTC INTERVAL: 402 MS
QTC INTERVAL: 407 MS
QTC INTERVAL: 410 MS
QTC INTERVAL: 416 MS
RBC # BLD AUTO: 5.4 10*6/MM3 (ref 4.14–5.8)
SODIUM SERPL-SCNC: 138 MMOL/L (ref 136–145)
SODIUM UR-SCNC: 114 MMOL/L
TRIGL SERPL-MCNC: 100 MG/DL (ref 0–150)
TROPONIN T SERPL HS-MCNC: 1511 NG/L
TROPONIN T SERPL HS-MCNC: 1543 NG/L
TSH SERPL DL<=0.05 MIU/L-ACNC: 1.91 UIU/ML (ref 0.27–4.2)
UFH PPP CHRO-ACNC: 0.1 IU/ML (ref 0.3–0.7)
UFH PPP CHRO-ACNC: 0.1 IU/ML (ref 0.3–0.7)
UFH PPP CHRO-ACNC: 0.11 IU/ML (ref 0.3–0.7)
VLDLC SERPL-MCNC: 19 MG/DL (ref 5–40)
WBC NRBC COR # BLD AUTO: 7.88 10*3/MM3 (ref 3.4–10.8)

## 2024-02-20 PROCEDURE — 93005 ELECTROCARDIOGRAM TRACING: CPT | Performed by: NURSE PRACTITIONER

## 2024-02-20 PROCEDURE — 83735 ASSAY OF MAGNESIUM: CPT | Performed by: NURSE PRACTITIONER

## 2024-02-20 PROCEDURE — 25010000002 MIDAZOLAM PER 1 MG: Performed by: INTERNAL MEDICINE

## 2024-02-20 PROCEDURE — 25010000002 BIVALIRUDIN TRIFLUOROACETATE 250 MG RECONSTITUTED SOLUTION: Performed by: INTERNAL MEDICINE

## 2024-02-20 PROCEDURE — B211YZZ FLUOROSCOPY OF MULTIPLE CORONARY ARTERIES USING OTHER CONTRAST: ICD-10-PCS | Performed by: INTERNAL MEDICINE

## 2024-02-20 PROCEDURE — 93306 TTE W/DOPPLER COMPLETE: CPT | Performed by: INTERNAL MEDICINE

## 2024-02-20 PROCEDURE — 84484 ASSAY OF TROPONIN QUANT: CPT | Performed by: NURSE PRACTITIONER

## 2024-02-20 PROCEDURE — 93458 L HRT ARTERY/VENTRICLE ANGIO: CPT | Performed by: INTERNAL MEDICINE

## 2024-02-20 PROCEDURE — 83036 HEMOGLOBIN GLYCOSYLATED A1C: CPT | Performed by: NURSE PRACTITIONER

## 2024-02-20 PROCEDURE — 83935 ASSAY OF URINE OSMOLALITY: CPT | Performed by: NURSE PRACTITIONER

## 2024-02-20 PROCEDURE — C1769 GUIDE WIRE: HCPCS | Performed by: INTERNAL MEDICINE

## 2024-02-20 PROCEDURE — 80061 LIPID PANEL: CPT | Performed by: NURSE PRACTITIONER

## 2024-02-20 PROCEDURE — 85520 HEPARIN ASSAY: CPT

## 2024-02-20 PROCEDURE — 93010 ELECTROCARDIOGRAM REPORT: CPT | Performed by: INTERNAL MEDICINE

## 2024-02-20 PROCEDURE — 93306 TTE W/DOPPLER COMPLETE: CPT

## 2024-02-20 PROCEDURE — 99232 SBSQ HOSP IP/OBS MODERATE 35: CPT | Performed by: INTERNAL MEDICINE

## 2024-02-20 PROCEDURE — 25010000002 EPTIFIBATIDE 20 MG/10ML SOLUTION: Performed by: INTERNAL MEDICINE

## 2024-02-20 PROCEDURE — 80048 BASIC METABOLIC PNL TOTAL CA: CPT | Performed by: NURSE PRACTITIONER

## 2024-02-20 PROCEDURE — B215YZZ FLUOROSCOPY OF LEFT HEART USING OTHER CONTRAST: ICD-10-PCS | Performed by: INTERNAL MEDICINE

## 2024-02-20 PROCEDURE — C1894 INTRO/SHEATH, NON-LASER: HCPCS | Performed by: INTERNAL MEDICINE

## 2024-02-20 PROCEDURE — 99221 1ST HOSP IP/OBS SF/LOW 40: CPT | Performed by: NURSE PRACTITIONER

## 2024-02-20 PROCEDURE — 25010000002 FENTANYL CITRATE (PF) 50 MCG/ML SOLUTION: Performed by: INTERNAL MEDICINE

## 2024-02-20 PROCEDURE — 85520 HEPARIN ASSAY: CPT | Performed by: INTERNAL MEDICINE

## 2024-02-20 PROCEDURE — 85025 COMPLETE CBC W/AUTO DIFF WBC: CPT | Performed by: NURSE PRACTITIONER

## 2024-02-20 PROCEDURE — 25010000002 ONDANSETRON PER 1 MG: Performed by: INTERNAL MEDICINE

## 2024-02-20 PROCEDURE — 25010000002 EPTIFIBATIDE PER 5 MG: Performed by: INTERNAL MEDICINE

## 2024-02-20 PROCEDURE — C1757 CATH, THROMBECTOMY/EMBOLECT: HCPCS | Performed by: INTERNAL MEDICINE

## 2024-02-20 PROCEDURE — 02C03ZZ EXTIRPATION OF MATTER FROM CORONARY ARTERY, ONE ARTERY, PERCUTANEOUS APPROACH: ICD-10-PCS | Performed by: INTERNAL MEDICINE

## 2024-02-20 PROCEDURE — 84300 ASSAY OF URINE SODIUM: CPT | Performed by: NURSE PRACTITIONER

## 2024-02-20 PROCEDURE — 92920 PRQ TRLUML C ANGIOP 1ART&/BR: CPT | Performed by: INTERNAL MEDICINE

## 2024-02-20 PROCEDURE — 84443 ASSAY THYROID STIM HORMONE: CPT | Performed by: NURSE PRACTITIONER

## 2024-02-20 PROCEDURE — 25510000001 IOPAMIDOL PER 1 ML: Performed by: INTERNAL MEDICINE

## 2024-02-20 PROCEDURE — 25010000002 MORPHINE PER 10 MG: Performed by: NURSE PRACTITIONER

## 2024-02-20 PROCEDURE — 92973 PRQ TRLUML C MCHN ASP THRMBC: CPT | Performed by: INTERNAL MEDICINE

## 2024-02-20 PROCEDURE — 25010000002 HEPARIN (PORCINE) PER 1000 UNITS: Performed by: INTERNAL MEDICINE

## 2024-02-20 PROCEDURE — C1725 CATH, TRANSLUMIN NON-LASER: HCPCS | Performed by: INTERNAL MEDICINE

## 2024-02-20 PROCEDURE — 25010000002 HEPARIN (PORCINE) 25000-0.45 UT/250ML-% SOLUTION: Performed by: INTERNAL MEDICINE

## 2024-02-20 PROCEDURE — 25010000002 HEPARIN (PORCINE) PER 1000 UNITS

## 2024-02-20 PROCEDURE — 85520 HEPARIN ASSAY: CPT | Performed by: NURSE PRACTITIONER

## 2024-02-20 PROCEDURE — 25010000002 BIVALIRUDIN TRIFLUOROACETATE 250 MG RECONSTITUTED SOLUTION 1 EACH VIAL: Performed by: INTERNAL MEDICINE

## 2024-02-20 PROCEDURE — C1887 CATHETER, GUIDING: HCPCS | Performed by: INTERNAL MEDICINE

## 2024-02-20 PROCEDURE — 4A023N7 MEASUREMENT OF CARDIAC SAMPLING AND PRESSURE, LEFT HEART, PERCUTANEOUS APPROACH: ICD-10-PCS | Performed by: INTERNAL MEDICINE

## 2024-02-20 RX ORDER — MIDAZOLAM HYDROCHLORIDE 1 MG/ML
INJECTION INTRAMUSCULAR; INTRAVENOUS
Status: DISCONTINUED | OUTPATIENT
Start: 2024-02-20 | End: 2024-02-20 | Stop reason: HOSPADM

## 2024-02-20 RX ORDER — SODIUM CHLORIDE 9 MG/ML
100 INJECTION, SOLUTION INTRAVENOUS CONTINUOUS
Status: DISCONTINUED | OUTPATIENT
Start: 2024-02-21 | End: 2024-02-21

## 2024-02-20 RX ORDER — ACETAMINOPHEN 325 MG/1
650 TABLET ORAL EVERY 4 HOURS PRN
Status: DISCONTINUED | OUTPATIENT
Start: 2024-02-20 | End: 2024-02-22 | Stop reason: HOSPADM

## 2024-02-20 RX ORDER — HEPARIN SODIUM 10000 [USP'U]/100ML
17 INJECTION, SOLUTION INTRAVENOUS
Status: DISCONTINUED | OUTPATIENT
Start: 2024-02-20 | End: 2024-02-21

## 2024-02-20 RX ORDER — DIPHENHYDRAMINE HYDROCHLORIDE 50 MG/ML
25 INJECTION INTRAMUSCULAR; INTRAVENOUS EVERY 6 HOURS PRN
Status: DISCONTINUED | OUTPATIENT
Start: 2024-02-20 | End: 2024-02-22 | Stop reason: HOSPADM

## 2024-02-20 RX ORDER — EPTIFIBATIDE 0.75 MG/ML
INJECTION, SOLUTION INTRAVENOUS
Status: COMPLETED | OUTPATIENT
Start: 2024-02-20 | End: 2024-02-20

## 2024-02-20 RX ORDER — BIVALIRUDIN 250 MG/5ML
INJECTION, POWDER, LYOPHILIZED, FOR SOLUTION INTRAVENOUS
Status: DISCONTINUED | OUTPATIENT
Start: 2024-02-20 | End: 2024-02-20 | Stop reason: HOSPADM

## 2024-02-20 RX ORDER — SODIUM CHLORIDE 9 MG/ML
3 INJECTION, SOLUTION INTRAVENOUS CONTINUOUS
Status: ACTIVE | OUTPATIENT
Start: 2024-02-20 | End: 2024-02-20

## 2024-02-20 RX ORDER — HEPARIN SODIUM 1000 [USP'U]/ML
INJECTION, SOLUTION INTRAVENOUS; SUBCUTANEOUS
Status: DISCONTINUED | OUTPATIENT
Start: 2024-02-20 | End: 2024-02-20 | Stop reason: HOSPADM

## 2024-02-20 RX ORDER — LIDOCAINE HYDROCHLORIDE 10 MG/ML
INJECTION, SOLUTION EPIDURAL; INFILTRATION; INTRACAUDAL; PERINEURAL
Status: DISCONTINUED | OUTPATIENT
Start: 2024-02-20 | End: 2024-02-20 | Stop reason: HOSPADM

## 2024-02-20 RX ORDER — NICARDIPINE HCL-0.9% SOD CHLOR 1 MG/10 ML
SYRINGE (ML) INTRAVENOUS
Status: DISCONTINUED | OUTPATIENT
Start: 2024-02-20 | End: 2024-02-20 | Stop reason: HOSPADM

## 2024-02-20 RX ORDER — ONDANSETRON 2 MG/ML
INJECTION INTRAMUSCULAR; INTRAVENOUS
Status: DISCONTINUED | OUTPATIENT
Start: 2024-02-20 | End: 2024-02-20 | Stop reason: HOSPADM

## 2024-02-20 RX ORDER — BISOPROLOL FUMARATE 5 MG/1
2.5 TABLET, FILM COATED ORAL
Status: DISCONTINUED | OUTPATIENT
Start: 2024-02-20 | End: 2024-02-22 | Stop reason: HOSPADM

## 2024-02-20 RX ORDER — HEPARIN SODIUM 1000 [USP'U]/ML
2000 INJECTION, SOLUTION INTRAVENOUS; SUBCUTANEOUS ONCE
Status: COMPLETED | OUTPATIENT
Start: 2024-02-20 | End: 2024-02-20

## 2024-02-20 RX ORDER — ONDANSETRON 2 MG/ML
4 INJECTION INTRAMUSCULAR; INTRAVENOUS EVERY 6 HOURS PRN
Status: DISCONTINUED | OUTPATIENT
Start: 2024-02-20 | End: 2024-02-21 | Stop reason: HOSPADM

## 2024-02-20 RX ORDER — NITROGLYCERIN 0.4 MG/1
0.4 TABLET SUBLINGUAL
Status: DISCONTINUED | OUTPATIENT
Start: 2024-02-20 | End: 2024-02-21 | Stop reason: HOSPADM

## 2024-02-20 RX ORDER — ALPRAZOLAM 0.25 MG/1
0.25 TABLET ORAL 3 TIMES DAILY PRN
Status: DISCONTINUED | OUTPATIENT
Start: 2024-02-20 | End: 2024-02-22 | Stop reason: HOSPADM

## 2024-02-20 RX ORDER — ASPIRIN 81 MG/1
81 TABLET ORAL DAILY
Status: DISCONTINUED | OUTPATIENT
Start: 2024-02-20 | End: 2024-02-22 | Stop reason: HOSPADM

## 2024-02-20 RX ORDER — NITROGLYCERIN 0.4 MG/1
0.4 TABLET SUBLINGUAL
Status: DISCONTINUED | OUTPATIENT
Start: 2024-02-20 | End: 2024-02-20 | Stop reason: SDUPTHER

## 2024-02-20 RX ORDER — HEPARIN SODIUM 1000 [USP'U]/ML
4000 INJECTION, SOLUTION INTRAVENOUS; SUBCUTANEOUS ONCE
Status: COMPLETED | OUTPATIENT
Start: 2024-02-20 | End: 2024-02-20

## 2024-02-20 RX ORDER — FENTANYL CITRATE 50 UG/ML
INJECTION, SOLUTION INTRAMUSCULAR; INTRAVENOUS
Status: DISCONTINUED | OUTPATIENT
Start: 2024-02-20 | End: 2024-02-20 | Stop reason: HOSPADM

## 2024-02-20 RX ORDER — EPTIFIBATIDE 2 MG/ML
INJECTION, SOLUTION INTRAVENOUS
Status: DISCONTINUED | OUTPATIENT
Start: 2024-02-20 | End: 2024-02-20 | Stop reason: HOSPADM

## 2024-02-20 RX ADMIN — MORPHINE SULFATE 1 MG: 2 INJECTION, SOLUTION INTRAMUSCULAR; INTRAVENOUS at 03:16

## 2024-02-20 RX ADMIN — Medication 10 MG: at 20:36

## 2024-02-20 RX ADMIN — PANTOPRAZOLE SODIUM 40 MG: 40 INJECTION, POWDER, FOR SOLUTION INTRAVENOUS at 20:26

## 2024-02-20 RX ADMIN — TICAGRELOR 90 MG: 90 TABLET ORAL at 20:27

## 2024-02-20 RX ADMIN — Medication 10 ML: at 20:28

## 2024-02-20 RX ADMIN — Medication 10 ML: at 09:13

## 2024-02-20 RX ADMIN — HEPARIN SODIUM 4000 UNITS: 1000 INJECTION INTRAVENOUS; SUBCUTANEOUS at 01:31

## 2024-02-20 RX ADMIN — HEPARIN SODIUM 16 UNITS/KG/HR: 10000 INJECTION, SOLUTION INTRAVENOUS at 21:19

## 2024-02-20 RX ADMIN — ATORVASTATIN CALCIUM 40 MG: 40 TABLET, FILM COATED ORAL at 20:27

## 2024-02-20 RX ADMIN — BISOPROLOL FUMARATE 2.5 MG: 5 TABLET, FILM COATED ORAL at 10:29

## 2024-02-20 RX ADMIN — PANTOPRAZOLE SODIUM 40 MG: 40 INJECTION, POWDER, FOR SOLUTION INTRAVENOUS at 09:12

## 2024-02-20 RX ADMIN — Medication 10 MG: at 00:27

## 2024-02-20 RX ADMIN — HEPARIN SODIUM 2000 UNITS: 1000 INJECTION INTRAVENOUS; SUBCUTANEOUS at 10:25

## 2024-02-20 RX ADMIN — ASPIRIN 81 MG: 81 TABLET, COATED ORAL at 20:27

## 2024-02-20 NOTE — CASE MANAGEMENT/SOCIAL WORK
Discharge Planning Assessment  [facility]     Patient Name: Taqueria Madden  MRN: [id number]  Today's Date: 2/20/2024    Admit Date: 2/19/2024    Plan: Home with spouse   Discharge Needs Assessment       Row Name 02/20/24 0828       Living Environment    People in Home spouse    Name(s) of People in Home Jyoti (spouse) [phone]    Current Living Arrangements home    Potentially Unsafe Housing Conditions none    Primary Care Provided by self    Provides Primary Care For no one    Family Caregiver if Needed spouse    Able to Return to Prior Arrangements yes       Resource/Environmental Concerns    Resource/Environmental Concerns none    Transportation Concerns none       Transition Planning    Patient/Family Anticipates Transition to home with family    Patient/Family Anticipated Services at Transition none    Transportation Anticipated family or friend will provide       Discharge Needs Assessment    Readmission Within the Last 30 Days no previous admission in last 30 days    Equipment Currently Used at Home none    Concerns to be Addressed denies needs/concerns at this time    Anticipated Changes Related to Illness none    Equipment Needed After Discharge none                   Discharge Plan       Row Name 02/20/24 0829       Plan    Plan Home with spouse    Patient/Family in Agreement with Plan yes    Plan Comments Spoke to patient at bedside. Lives with Jyoti (spouse) [phone] in [place]. Is independent with ADL's. No problems with [employer] or medications. Uses no DME at home. Has no advanced directives. PCP is BRITTANY [facility]. Plan is home with spouse. Spouse will transport. CM will continue to follow.    Final Discharge Disposition Code 01 - home or self-care                  Continued Care and Services - Admitted Since 2/19/2024    Coordination has not been started for this encounter.          Demographic Summary       Row Name 02/20/24 0827       General Information    Admission Type  inpatient    Arrived From emergency department    Referral Source admission list    Reason for Consult discharge planning    Preferred Language English       Contact Information    Permission Granted to Share Info With     Contact Information Obtained for                    Functional Status       Row Name 02/20/24 0828       Functional Status    Usual Activity Tolerance good    Current Activity Tolerance good       Functional Status, IADL    Medications independent    Meal Preparation independent    Housekeeping independent    Laundry independent    Shopping independent       Mental Status    General Appearance WDL WDL       Mental Status Summary    Recent Changes in Mental Status/Cognitive Functioning no changes       Employment/    Employment Status employed full-time                   Psychosocial    No documentation.                  Abuse/Neglect    No documentation.                  Legal    No documentation.                  Substance Abuse    No documentation.                  Patient Forms    No documentation.                     Barney Clemons RN

## 2024-02-20 NOTE — H&P
[facility] Medicine Services  HISTORY AND PHYSICAL    Patient Name: Taqueria Madden  : 1960  MRN: [id number]  Primary Care Physician: Liya Rubio APRN  Date of admission: 2024    Subjective   Subjective     Chief Complaint:  Chest pain    HPI:  Taqueria Madden is a 63 y.o. male with a history of hypertension, hyperlipidemia, presents to the ED with complaints of chest pain.  Patient reports that he has had intermittent chest pain since last Thursday.  Last week he had a GI bug and his chest pain started after a vomiting attack.  He initially thought his chest pain was due to a possible pulled muscle.  He had difficulty sleeping last night after dinner due to his chest pain.  Today after eating toast his pain returned.  He has noticed his pain occurs after eating.  He has been taking Tums at home, that helped at first but no longer helps with his pain.  His pain is midsternal and radiates to his neck, shoulders, and back with associated shortness of air, and diaphoresis.  Sometimes leaning forward or holding his arms up helps with his pain.  Currently his pain is rated as a 5/10.  He endorses a loss of appetite, chills, fatigue, palpitations, neck pain, myalgias, and some agitation.  He has never had a stress test.  He has never had pain like this in the past.  Chest xray shows no acute cardiopulmonary disease.  ED discussed case with cardiology who plans to take him to the cath lab in the am.  Patient was given aspirin and started on a heparin drip in the ED.  Patient is being admitted to the Hospitalist for further evaluation and management.        Review of Systems   Constitutional:  Positive for appetite change, chills, diaphoresis and fatigue. Negative for fever.   HENT: Negative.     Eyes: Negative.    Respiratory:  Positive for shortness of breath. Negative for cough and wheezing.    Cardiovascular:  Positive for chest pain and palpitations. Negative for leg swelling.    Gastrointestinal: Negative.    Endocrine: Negative.    Genitourinary: Negative.    Musculoskeletal:  Positive for back pain, myalgias and neck pain.   Skin: Negative.    Allergic/Immunologic: Negative.    Neurological: Negative.    Hematological: Negative.    Psychiatric/Behavioral:  Positive for agitation. Negative for confusion.         Personal History     Past Medical History:   Diagnosis Date    Hyperlipidemia     Hypertension      Past Surgical History:   Procedure Laterality Date    EYE SURGERY      VASECTOMY         Family History:  family history includes Dementia in his father; Heart disease in his mother; Thyroid disease in his mother.     Social History:  reports that he has quit smoking. His smoking use included cigarettes. He has never used smokeless tobacco. He reports current alcohol use. He reports current drug use. Drug: Marijuana.  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, has 2 kids      Medications:  Melatonin, atorvastatin, and mometasone    No Known Allergies    Objective   Objective     Vital Signs:   Temp:  [97.9 °F (36.6 °C)-98.2 °F (36.8 °C)] 98.2 °F (36.8 °C)  Heart Rate:  [] 89  Resp:  [18-20] 20  BP: (152-175)/() 156/114    Physical Exam   Constitutional: Awake, alert, resting in bed  Eyes: PERRLA, sclerae anicteric, no conjunctival injection  HENT: NCAT, mucous membranes moist  Neck: Supple, no thyromegaly, no lymphadenopathy, trachea midline  Respiratory: Clear to auscultation bilaterally, nonlabored respirations   Cardiovascular: Tachycardia, no murmurs, rubs, or gallops, palpable pedal pulses bilaterally  Gastrointestinal: Positive bowel sounds, soft, nontender, nondistended  Musculoskeletal: No bilateral ankle edema, no clubbing or cyanosis to extremities  Psychiatric: Appropriate affect, cooperative  Neurologic: Oriented x 3, strength symmetric in all extremities, Cranial Nerves grossly intact to confrontation, speech clear  Skin: No rashes       Result Review:  I have  personally reviewed the results from the time of this admission to 2/19/2024 20:07 EST and agree with these findings:  [x]  Laboratory list / accordion  []  Microbiology  [x]  Radiology  [x]  EKG/Telemetry   []  Cardiology/Vascular   []  Pathology  []  Old records  []  Other:  Most notable findings include: Troponin 1220 and 1331, delta 111, sodium 134, glucose 136, hemoglobin 18.5, hematocrit 52.6    LAB RESULTS:      Lab 02/19/24  1603   WBC 8.28   HEMOGLOBIN 18.5*   HEMATOCRIT 52.6*   PLATELETS 269   NEUTROS ABS 5.36   IMMATURE GRANS (ABS) 0.04   LYMPHS ABS 1.98   MONOS ABS 0.75   EOS ABS 0.11   MCV 86.7   PROCALCITONIN 0.11   LACTATE 1.5   PROTIME 13.7   APTT 30.7*   HEPARIN ANTI-XA 0.10*   D DIMER QUANT <0.27         Lab 02/19/24  1603   SODIUM 134*   POTASSIUM 3.5   CHLORIDE 99   CO2 23.0   ANION GAP 12.0   BUN 13   CREATININE 0.87   EGFR 97.0   GLUCOSE 136*   CALCIUM 9.7         Lab 02/19/24  1603   TOTAL PROTEIN 7.9   ALBUMIN 4.5   GLOBULIN 3.4   ALT (SGPT) 35   AST (SGOT) 40   BILIRUBIN 1.7*   ALK PHOS 81   LIPASE 20         Lab 02/19/24  1844 02/19/24  1603   PROBNP  --  788.3   HSTROP T 1,331* 1,220*   PROTIME  --  13.7   INR  --  1.04                 Brief Urine Lab Results       None          Microbiology Results (last 10 days)       ** No results found for the last 240 hours. **            XR Chest 1 View    Result Date: 2/19/2024  XR CHEST 1 VW Date of Exam: 2/19/2024 5:06 PM EST Indication: Chest Pain Triage Protocol Comparison: None available. Findings: No focal consolidation. No pneumothorax or pleural effusion. Cardiac size is normal. The visualized clavicles appear intact. No displaced rib fractures. The visualized upper abdomen is normal.     Impression: Impression: No acute cardiopulmonary disease. Electronically Signed: Conor Mitchell MD  2/19/2024 5:43 PM EST  Workstation ID: [id number]         Assessment & Plan   Assessment & Plan       Chest pain    Hypertension    Hyperlipidemia     Hyponatremia    Hyperglycemia    Taqueria Madden is a 63 y.o. male with a history of hypertension, hyperlipidemia, presents to the ED with complaints of chest pain and markedly ABNORMAL EKG and troponin    Assessment and Plan:    N[facility]I  [facility], trending down now but troponin trending up and chest pain not well relieved.  -- Chest x-ray shows no acute cardiopulmonary disease  -- HS troponin 1220 and 1331, delta 111  -- Was given aspirin in the ED  -- Continue heparin drip, add nitro drip and PRN morphine  -- CT chest is negative  -- N.p.o. after midnight  -- Consult cardiology in the a.m.-- plan for [facility]  -- Trend troponin  -- A.m. labs    Hypertension  Hyperlipidemia  --Continue statin  -- Patient not on any home antihypertensive medications    Mild hyponatremia  --   --Was given a liter saline in the ED  -- Urine sodium and urine osmolality  -- IV fluids overnight  -- BMP in the a.m.    Hyperglycemia  -- Glucose 136  -- A1c in the a.m.      DVT prophylaxis: On heparin drip    CODE STATUS:    Level Of Support Discussed With: Patient  Code Status (Patient has no pulse and is not breathing): CPR (Attempt to Resuscitate)  Medical Interventions (Patient has pulse or is breathing): Full Support      Expected DischargeTBD  Expected discharge date/ time has not been documented.      This note has been completed as part of a split-shared workflow.     Signature: Electronically signed by BRITTANY Valdes, 02/19/24, 8:08 PM EST.       Attending   Admission Attestation     I have performed an independent face-to-face diagnostic evaluation including performing an independent physical examination as documented here.  The documented plan of care above was reviewed and developed with the advanced practice clinician (APC).    Brief Summary Statement:   Taqueria Madden is a 63 y.o. male who  has a past medical history of Hyperlipidemia and Hypertension.  that presented with chest pain after recent GI  illness. Typical story, ABnormal EKG and troponins.   He required nitropaste, morphine and then nitro drip for pain relief.  Viscous lidocaine added.  CT done to RO  esophageal rupture.. but does show thickened esophagus and may benefit form EGD>    Attending Physical Exam:  Temp:  [97.9 °F (36.6 °C)-98.2 °F (36.8 °C)] 97.9 °F (36.6 °C)  Heart Rate:  [] 80  Resp:  [18-20] 18  BP: (146-175)/() 146/97    Constitutional: Awake, alert, interactive and pleasant  Eyes: clear sclerae, no conjunctival injection  HENT: NCAT, mucous membranes moist  Neck: no masses or lymphadenopathy, trachea midline  Respiratory: good breath sounds bilaterally, respirations unlabored  Cardiovascular: RRR, no murmurs appreciated, palpable peripheral pulses  Abdomen:  soft, no HSM or masses palpable, not tender or distended- but protuberant  Musculoskeletal: No peripheral edema, clubbing or cyanosis  Neurologic: Oriented x 3,                       Strength symmetric in all extremities                     Cranial Nerves grossly intact, speech clear  Skin: No rashes or jaundice  Psychiatric: Appropriate mood, insight    Result Review:  I have personally reviewed the results from the time of this admission to 2/19/2024 23:17 EST and agree with these findings:  [x]  Laboratory list / accordion  [x]  Microbiology  [x]  Radiology  [x]  EKG/Telemetry   []  Cardiology/Vascular   []  Pathology  [x]  Old records  []  Other:  Most notable findings include: ST elevation  improved, abnormal troponin.      Glendy Cisse MD  02/19/24

## 2024-02-20 NOTE — PROGRESS NOTES
[facility] Medicine Services  PROGRESS NOTE    Patient Name: Taqueria Madden  : 1960  MRN: [id number]    Date of Admission: 2024  Primary Care Physician: Liya Rubio APRN    Subjective   Subjective     CC: f/u cp    HPI: Up in bed with family in room. CP free at this time.      Objective   Objective     Vital Signs:   Temp:  [97.9 °F (36.6 °C)-98.2 °F (36.8 °C)] 97.9 °F (36.6 °C)  Heart Rate:  [] 87  Resp:  [18-20] 18  BP: (131-175)/() 147/92     Physical Exam:  Constitutional: No acute distress, awake, alert  HENT: NCAT, mucous membranes moist  Respiratory: Clear to auscultation bilaterally, respiratory effort normal   Cardiovascular: RRR, no murmurs, rubs, or gallops  Gastrointestinal: Positive bowel sounds, soft, nontender, nondistended  Musculoskeletal: No bilateral ankle edema  Psychiatric: Appropriate affect, cooperative  Neurologic: Oriented x 3, strength symmetric in all extremities, Cranial Nerves grossly intact to confrontation, speech clear  Skin: No rashes     Results Reviewed:  LAB RESULTS:      Lab 24  0821 24  0615 24  0029 24  1603   WBC  --  7.88  --  8.28   HEMOGLOBIN  --  15.6  --  18.5*   HEMATOCRIT  --  46.8  --  52.6*   PLATELETS  --  240  --  269   NEUTROS ABS  --  4.74  --  5.36   IMMATURE GRANS (ABS)  --  0.04  --  0.04   LYMPHS ABS  --  2.25  --  1.98   MONOS ABS  --  0.74  --  0.75   EOS ABS  --  0.07  --  0.11   MCV  --  86.7  --  86.7   PROCALCITONIN  --   --   --  0.11   LACTATE  --   --   --  1.5   PROTIME  --   --   --  13.7   APTT  --   --   --  30.7*   HEPARIN ANTI-XA 0.11*  --  0.10* 0.10*   D DIMER QUANT  --   --   --  <0.27         Lab 24  0615 24  1603   SODIUM 138 134*   POTASSIUM 4.0 3.5   CHLORIDE 105 99   CO2 21.0* 23.0   ANION GAP 12.0 12.0   BUN 13 13   CREATININE 0.83 0.87   EGFR 98.3 97.0   GLUCOSE 117* 136*   CALCIUM 8.5* 9.7   MAGNESIUM 2.2  --    HEMOGLOBIN A1C 5.60  --    TSH  1.910  --          Lab 02/19/24  1603   TOTAL PROTEIN 7.9   ALBUMIN 4.5   GLOBULIN 3.4   ALT (SGPT) 35   AST (SGOT) 40   BILIRUBIN 1.7*   ALK PHOS 81   LIPASE 20         Lab 02/20/24  0615 02/20/24  0029 02/19/24  1844 02/19/24  1603   PROBNP  --   --   --  788.3   HSTROP T 1,543* 1,511* 1,331* 1,220*   PROTIME  --   --   --  13.7   INR  --   --   --  1.04         Lab 02/20/24  0615   CHOLESTEROL 137   LDL CHOL 88   HDL CHOL 30*   TRIGLYCERIDES 100             Brief Urine Lab Results       None            Microbiology Results Abnormal       None            CT Chest Without Contrast Diagnostic    Result Date: 2/19/2024  CT CHEST WO CONTRAST DIAGNOSTIC Date of Exam: 2/19/2024 7:47 PM EST Indication: Chest pain, nonspecific pain after vomiting. Comparison: None available. Technique: Axial CT images were obtained of the chest without contrast administration.  Reconstructed coronal and sagittal images were also obtained. Automated exposure control and iterative construction methods were used. Findings: The thyroid gland is normal. The subglottic airway is clear. Calcified granuloma within the right upper lobe. Right hilar calcified lymph nodes. The left lung is clear. No consolidation. No pneumothorax or pleural effusion. Adenopathy. Mild atheromatous disease of the coronary vessels. Partially visualized likely left renal cyst. Calcified granulomata within the spleen. Otherwise the visualized upper abdomen is normal.     Impression: No rib fractures. No acute cardiopulmonary disease is noted. Electronically Signed: Conor Mitchell MD  2/19/2024 8:49 PM EST  Workstation ID: [id number]    XR Chest 1 View    Result Date: 2/19/2024  XR CHEST 1 VW Date of Exam: 2/19/2024 5:06 PM EST Indication: Chest Pain Triage Protocol Comparison: None available. Findings: No focal consolidation. No pneumothorax or pleural effusion. Cardiac size is normal. The visualized clavicles appear intact. No displaced rib fractures. The visualized upper  abdomen is normal.     Impression: Impression: No acute cardiopulmonary disease. Electronically Signed: Conor Mitchell MD  2/19/2024 5:43 PM EST  Workstation ID: [id number]         Current medications:  Scheduled Meds:aspirin, 324 mg, Oral, Once  atorvastatin, 40 mg, Oral, Nightly  bisoprolol, 2.5 mg, Oral, Q24H  pantoprazole, 40 mg, Intravenous, Q12H  pharmacy consult - [place], , Does not apply, Daily  sodium chloride, 1,000 mL, Intravenous, Once  sodium chloride, 10 mL, Intravenous, Q12H      Continuous Infusions:heparin, 16 Units/kg/hr, Last Rate: 16 Units/kg/hr (02/20/24 1028)  nitroglycerin, 5-200 mcg/min, Last Rate: 15 mcg/min (02/20/24 0123)  Pharmacy to Dose Heparin,       PRN Meds:.  acetaminophen **OR** acetaminophen **OR** acetaminophen    senna-docusate sodium **AND** polyethylene glycol **AND** bisacodyl **AND** bisacodyl    Calcium Replacement - Follow Nurse / BPA Driven Protocol    Magnesium Standard Dose Replacement - Follow Nurse / BPA Driven Protocol    melatonin    Morphine **AND** naloxone    Pharmacy to Dose Heparin    Phosphorus Replacement - Follow Nurse / BPA Driven Protocol    Potassium Replacement - Follow Nurse / BPA Driven Protocol    sodium chloride    sodium chloride    sodium chloride    Assessment & Plan   Assessment & Plan     Active Hospital Problems    Diagnosis  POA    **Unstable angina [I20.0]  Yes    Hypertension [I10]  Yes    Hyperlipidemia [E78.5]  Yes    Hyponatremia [E87.1]  Yes    Hyperglycemia [R73.9]  Yes    NSTEMI (non-ST elevated myocardial infarction) [I21.4]  Yes      Resolved Hospital Problems   No resolved problems to display.        Brief Hospital Course to date:  Taqueria Madden is a 63 y.o. male with a history of hypertension, hyperlipidemia, presents to the ED with complaints of chest pain and markedly ABNORMAL EKG and troponin    NSTEMI  -- Chest x-ray shows no acute cardiopulmonary disease. CT chest neg.   -- Continue asa, statin, heparin drip, bisoprolol, nitro  gtt  -- Cardiology follows, C today.     Hypertension  Hyperlipidemia  -- Continue statin  -- Started bisoprolol as above.     Mild hyponatremia, not significant  -- Resolved after IVF.      Hyperglycemia  Obesity    Expected Discharge Location and Transportation:   Expected Discharge   Expected discharge date/ time has not been documented.     DVT prophylaxis:  Medical DVT prophylaxis orders are present.         AM-PAC 6 Clicks Score (PT): 24 (02/19/24 2041)    CODE STATUS:   Code Status and Medical Interventions:   Ordered at: 02/19/24 2007     Level Of Support Discussed With:    Patient     Code Status (Patient has no pulse and is not breathing):    CPR (Attempt to Resuscitate)     Medical Interventions (Patient has pulse or is breathing):    Full Support       Peg Bo II, DO  02/20/24

## 2024-02-20 NOTE — PROGRESS NOTES
HEPARIN INFUSION  Taqueria Madden is a  63 y.o. male receiving heparin infusion.     Therapy for (VTE/Cardiac):   Cardiac Protocol - ACS (Unstable Angina, Myocarditis)                  Continue with full-dose anticoagulation until Cath Lab tomorrow (2/20/24)  Patient Weight: 95.3 kg  Initial Bolus (Y/N):   Yes  Any Bolus (Y/N):   Yes        Signs or Symptoms of Bleeding: No S/Sx bleeding per RN    Cardiac or Other (Not VTE)   Initial Bolus: 60 units/kg (Max 4,000 units)  Initial rate: 12 units/kg/hr (Max 1,000 units/hr)   Anti Xa Rebolus Infusion Hold time Change infusion Dose (Units/kg/hr) Next Anti Xa or aPTT Level Due   < 0.11 50 Units/kg  (4000 Units Max) None Increase by  3 Units/kg/hr 6 hours   0.11- 0.19 25 Units/kg  (2000 Units Max) None Increase by  2 Units/kg/hr 6 hours   0.2 - 0.29 0 None Increase by  1 Units/kg/hr 6 hours   0.3 - 0.5 0 None No Change 6 hours (after 2 consecutive levels in range check qAM)   0.51 - 0.6 0 None Decrease by  1 Units/kg/hr 6 hours   0.61 - 0.8 0 30 Minutes Decrease by  2 Units/kg/hr 6 hours   0.81 - 1 0 60 Minutes Decrease by  3 Units/kg/hr 6 hours   >1 0 Hold  After Anti Xa less than 0.5 decrease previous rate by  4 Units/kg/hr  Every 2 hours until Anti Xa  less than 0.5 then when infusion restarts in 6 hours     Recommend Xa every 6 hours.     Results from last 7 days   Lab Units 02/20/24  0615 02/19/24  1603   INR   --  1.04   HEMOGLOBIN g/dL 15.6 18.5*   HEMATOCRIT % 46.8 52.6*   PLATELETS 10*3/mm3 240 269          Date   Time   Anti-Xa Current Rate (Unit/kg/hr) Bolus   (Units) Rate Change   (Unit/kg/hr) New Rate (Unit/kg/hr) Next   Anti-Xa Comments  Pump Check Daily   2/19 16:03 0.10 ---new--- 4000  +11 11 00:00 Set up IV pump w/ RN. TBW, bolus, rate confirmed.    2/20 00:29 0.10 11 4000 +3 14 0800 D/w RN, heparin gtt has been running continuously without any issues   2/20 08:21 0.11 14 2000 +2 16 1600 DW RN; pump check                                                                                                                                                                                                            Joshua Wilson, PharmD  2/20/2024  09:23 EST

## 2024-02-20 NOTE — CONSULTS
[facility]   Consult Note    Referring Provider: Dr. Cisse    Reason for Consultation: NSTEMI    Patient Care Team:  Liya Rubio APRN as PCP - General (Family Medicine)    Problem List:  NSTEMI  Hypertension  Dyslipidemia  Surgeries:  Eye surgery  Vasectomy       No Known Allergies        Current Facility-Administered Medications:     acetaminophen (TYLENOL) tablet 650 mg, 650 mg, Oral, Q4H PRN **OR** acetaminophen (TYLENOL) 160 MG/5ML oral solution 650 mg, 650 mg, Oral, Q4H PRN **OR** acetaminophen (TYLENOL) suppository 650 mg, 650 mg, Rectal, Q4H PRN, Bijan Storeya W, APRN    aspirin chewable tablet 324 mg, 324 mg, Oral, Once, Liz Storey, APRN    atorvastatin (LIPITOR) tablet 40 mg, 40 mg, Oral, Nightly, Bijan Storeya W, APRN, 40 mg at 02/19/24 2237    sennosides-docusate (PERICOLACE) 8.6-50 MG per tablet 2 tablet, 2 tablet, Oral, BID PRN **AND** polyethylene glycol (MIRALAX) packet 17 g, 17 g, Oral, Daily PRN **AND** bisacodyl (DULCOLAX) EC tablet 5 mg, 5 mg, Oral, Daily PRN **AND** bisacodyl (DULCOLAX) suppository 10 mg, 10 mg, Rectal, Daily PRN, Bijan Storeya W, APRN    Calcium Replacement - Follow Nurse / BPA Driven Protocol, , Does not apply, PRN, Bijan Storeya W, APRN    heparin 70013 units/250 mL (100 units/mL) in 0.45 % NaCl infusion, 14 Units/kg/hr, Intravenous, Titrated, Sariah Thomson, RPH, Last Rate: 13.34 mL/hr at 02/20/24 0132, 14 Units/kg/hr at 02/20/24 0132    Magnesium Standard Dose Replacement - Follow Nurse / BPA Driven Protocol, , Does not apply, PRN, [place] Liz W, APRN    melatonin tablet 10 mg, 10 mg, Oral, Nightly PRN, Bijan Storeya W, APRN, 10 mg at 02/20/24 0027    morphine injection 1 mg, 1 mg, Intravenous, Q4H PRN, 1 mg at 02/20/24 0316 **AND** naloxone (NARCAN) injection 0.4 mg, 0.4 mg, Intravenous, Q5 Min PRN, Liz Storey, BRITTANY    nitroglycerin (TRIDIL) 200 mcg/ml infusion, 5-200 mcg/min, Intravenous,  Titrated, Glendy Cisse MD, Last Rate: 4.5 mL/hr at 02/20/24 0123, 15 mcg/min at 02/20/24 0123    pantoprazole (PROTONIX) injection 40 mg, 40 mg, Intravenous, Q12H, Glendy Cisse MD, 40 mg at 02/19/24 2237    Pharmacy Consult - [facility], , Does not apply, Daily, Joshua Wilson, [facility]    Pharmacy to Dose Heparin, , Does not apply, Continuous PRN, Raleigh Liz W, APRN    Phosphorus Replacement - Follow Nurse / BPA Driven Protocol, , Does not apply, PRN, Storey, Liz W, APRN    Potassium Replacement - Follow Nurse / BPA Driven Protocol, , Does not apply, PRN, Storey Liz W, APRN    sodium chloride 0.9 % bolus 1,000 mL, 1,000 mL, Intravenous, Once, Storey, Liz W, APRN    sodium chloride 0.9 % flush 10 mL, 10 mL, Intravenous, PRN, Addison Garcia MD    sodium chloride 0.9 % flush 10 mL, 10 mL, Intravenous, Q12H, Storey Liz W, APRN, 10 mL at 02/19/24 2233    sodium chloride 0.9 % flush 10 mL, 10 mL, Intravenous, PRN, [place] Liz W, APRN    sodium chloride 0.9 % infusion 40 mL, 40 mL, Intravenous, PRN, Storey, Liz W, APRN    heparin, 14 Units/kg/hr, Last Rate: 14 Units/kg/hr (02/20/24 0132)  nitroglycerin, 5-200 mcg/min, Last Rate: 15 mcg/min (02/20/24 0123)  Pharmacy to Dose Heparin,         Medications Prior to Admission   Medication Sig Dispense Refill Last Dose    Melatonin 10 MG tablet Take  by mouth.   2/18/2024    atorvastatin (LIPITOR) 10 MG tablet Take 1 tablet by mouth Daily.   More than a month    mometasone (ELOCON) 0.1 % ointment Apply  topically to the appropriate area as directed Daily.            Subjective .   History of present illness:    Patient is a 63-year-old  male who we are asked to see today for further evaluation of NSTEMI.  He has no previous history of any cardiac issues.  Has known dyslipidemia for which she has been on statin therapy.  Patient reports over the last few months he has noted some increasing shortness of breath and  easy fatigability with exertion.  Thought that this was related to working from home in general and activity.  Patient notes that his son-in-law had a GI illness that was then passed along to he and his wife.  Patient noted significant amount of nausea, vomiting, and diarrhea.  This began Thursday of last week.  The following day he noted chest discomfort that was waxing and waning.  Had radiation of his discomfort up into his neck and jaw area.  Due to persistence of symptoms patient was brought to the hospital for further evaluation.  EKG has subtle changes and his troponins were noted to be significantly elevated.  Patient was placed on heparin and nitroglycerin.  Currently he is pain-free.  Still notes some difficulty with eating.      Social History     Socioeconomic History    Marital status:    Tobacco Use    Smoking status: Former     Types: Cigarettes    Smokeless tobacco: Never   Vaping Use    Vaping Use: Never used   Substance and Sexual Activity    Alcohol use: Yes     Comment: wine once a week    Drug use: Yes     Types: Marijuana     Comment: weekends    Sexual activity: Defer     Family History   Problem Relation Age of Onset    Heart disease Mother     Thyroid disease Mother     Dementia Father          Review of Systems:  Review of Systems   Constitutional: Positive for malaise/fatigue. Negative for fever.   HENT:  Negative for nosebleeds.    Eyes:  Negative for redness and visual disturbance.   Cardiovascular:  Positive for chest pain. Negative for orthopnea, palpitations and paroxysmal nocturnal dyspnea.   Respiratory:  Negative for cough, snoring, sputum production and wheezing.    Hematologic/Lymphatic: Negative for bleeding problem.   Skin:  Negative for flushing, itching and rash.   Musculoskeletal:  Negative for falls, joint pain and muscle cramps.   Gastrointestinal:  Positive for heartburn, nausea and vomiting. Negative for abdominal pain and diarrhea.   Genitourinary:  Negative for  "hematuria.   Neurological:  Negative for excessive daytime sleepiness, dizziness, headaches, tremors and weakness.   Psychiatric/Behavioral:  Negative for substance abuse. The patient is not nervous/anxious.               Objective   Vitals:  /99   Pulse 100   Temp 98 °F (36.7 °C) (Oral)   Resp 18   Ht 177.8 cm (70\")   Wt 95.3 kg (210 lb)   SpO2 97%   BMI 30.13 kg/m²    No intake or output data in the 24 hours ending 02/20/24 0856    Vitals reviewed.   Constitutional:       Appearance: Healthy appearance. Well-developed and not in distress.   Neck:      Vascular: No JVD.      Trachea: No tracheal deviation.   Pulmonary:      Effort: Pulmonary effort is normal.      Breath sounds: Normal breath sounds.   Cardiovascular:      Normal rate. Regular rhythm.      Murmurs: There is no murmur.   Pulses:     Intact distal pulses.   Edema:     Peripheral edema absent.   Abdominal:      General: Bowel sounds are normal.      Tenderness: There is no abdominal tenderness.   Musculoskeletal:         General: No deformity. Skin:     General: Skin is warm and dry.   Neurological:      Mental Status: Alert and oriented to person, place, and time.              Results Review:  I reviewed the patient's new clinical results.  Results from last 7 days   Lab Units 02/20/24  0615   WBC 10*3/mm3 7.88   HEMOGLOBIN g/dL 15.6   HEMATOCRIT % 46.8   PLATELETS 10*3/mm3 240     Results from last 7 days   Lab Units 02/20/24  0615 02/19/24  1603   SODIUM mmol/L 138 134*   POTASSIUM mmol/L 4.0 3.5   CHLORIDE mmol/L 105 99   CO2 mmol/L 21.0* 23.0   BUN mg/dL 13 13   CREATININE mg/dL 0.83 0.87   CALCIUM mg/dL 8.5* 9.7   BILIRUBIN mg/dL  --  1.7*   ALK PHOS U/L  --  81   ALT (SGPT) U/L  --  35   AST (SGOT) U/L  --  40   GLUCOSE mg/dL 117* 136*     Results from last 7 days   Lab Units 02/20/24  0615   SODIUM mmol/L 138   POTASSIUM mmol/L 4.0   CHLORIDE mmol/L 105   CO2 mmol/L 21.0*   BUN mg/dL 13   CREATININE mg/dL 0.83   GLUCOSE mg/dL 117* "   CALCIUM mg/dL 8.5*     Results from last 7 days   Lab Units 02/19/24  1603   INR  1.04     Lab Results   Lab Value Date/Time    TROPONINT 1,543 (C) 02/20/2024 0615    TROPONINT 1,511 (C) 02/20/2024 0029    TROPONINT 1,331 (C) 02/19/2024 1844    TROPONINT 1,220 (C) 02/19/2024 1603     Results from last 7 days   Lab Units 02/20/24  0615   TSH uIU/mL 1.910     Results from last 7 days   Lab Units 02/20/24  0615   CHOLESTEROL mg/dL 137   TRIGLYCERIDES mg/dL 100   HDL CHOL mg/dL 30*   LDL CHOL mg/dL 88     Results from last 7 days   Lab Units 02/19/24  1603   PROBNP pg/mL 788.3           Tele:  SR    EKG: SR NS ST TW changes.       Assessment & Plan     NSTEMI, currently pain-free.  On heparin and nitroglycerin drips.  Hypertension, noted on admission.  Per patient no previous diagnosis of this.  Dyslipidemia, stable on statin.  Recent GI illness      Plan:    Given patient's noted significantly elevated cardiac enzymes we will proceed to cardiac catheterization plus or minus catheter-based intervention today.  This was discussed with patient and family.  They verbalized understanding and wished to proceed.  Discussed with patient and family other alternative diagnoses.  However, given his significantly elevated cardiac enzymes feel definitive ischemic evaluation is warranted.  They agree.  Will add beta-blockade for blood pressure control and NSTEMI.  Continue aspirin and statin.  Check echocardiogram  Further recommendations to follow procedure.      BRITTANY Hanna     Dictated utilizing Dragon dictation

## 2024-02-21 ENCOUNTER — TRANSCRIBE ORDERS (OUTPATIENT)
Dept: CARDIAC REHAB | Facility: HOSPITAL | Age: 64
End: 2024-02-21
Payer: COMMERCIAL

## 2024-02-21 DIAGNOSIS — I21.4 NSTEMI, INITIAL EPISODE OF CARE: Primary | ICD-10-CM

## 2024-02-21 LAB
ANION GAP SERPL CALCULATED.3IONS-SCNC: 9 MMOL/L (ref 5–15)
BUN SERPL-MCNC: 12 MG/DL (ref 8–23)
BUN/CREAT SERPL: 14.8 (ref 7–25)
CALCIUM SPEC-SCNC: 8.4 MG/DL (ref 8.6–10.5)
CHLORIDE SERPL-SCNC: 103 MMOL/L (ref 98–107)
CHOLEST SERPL-MCNC: 120 MG/DL (ref 0–200)
CO2 SERPL-SCNC: 23 MMOL/L (ref 22–29)
CREAT SERPL-MCNC: 0.81 MG/DL (ref 0.76–1.27)
DEPRECATED RDW RBC AUTO: 40.3 FL (ref 37–54)
EGFRCR SERPLBLD CKD-EPI 2021: 99.1 ML/MIN/1.73
ERYTHROCYTE [DISTWIDTH] IN BLOOD BY AUTOMATED COUNT: 13 % (ref 12.3–15.4)
GLUCOSE SERPL-MCNC: 104 MG/DL (ref 65–99)
HCT VFR BLD AUTO: 44 % (ref 37.5–51)
HDLC SERPL-MCNC: 28 MG/DL (ref 40–60)
HGB BLD-MCNC: 15.3 G/DL (ref 13–17.7)
LDLC SERPL CALC-MCNC: 75 MG/DL (ref 0–100)
LDLC/HDLC SERPL: 2.68 {RATIO}
MCH RBC QN AUTO: 30.2 PG (ref 26.6–33)
MCHC RBC AUTO-ENTMCNC: 34.8 G/DL (ref 31.5–35.7)
MCV RBC AUTO: 86.8 FL (ref 79–97)
PLATELET # BLD AUTO: 231 10*3/MM3 (ref 140–450)
PMV BLD AUTO: 9.6 FL (ref 6–12)
POTASSIUM SERPL-SCNC: 4 MMOL/L (ref 3.5–5.2)
QT INTERVAL: 340 MS
QTC INTERVAL: 411 MS
RBC # BLD AUTO: 5.07 10*6/MM3 (ref 4.14–5.8)
SODIUM SERPL-SCNC: 135 MMOL/L (ref 136–145)
TRIGL SERPL-MCNC: 85 MG/DL (ref 0–150)
UFH PPP CHRO-ACNC: 0.29 IU/ML (ref 0.3–0.7)
UFH PPP CHRO-ACNC: 0.51 IU/ML (ref 0.3–0.7)
VLDLC SERPL-MCNC: 17 MG/DL (ref 5–40)
WBC NRBC COR # BLD AUTO: 9.02 10*3/MM3 (ref 3.4–10.8)

## 2024-02-21 PROCEDURE — C1887 CATHETER, GUIDING: HCPCS | Performed by: INTERNAL MEDICINE

## 2024-02-21 PROCEDURE — 85027 COMPLETE CBC AUTOMATED: CPT | Performed by: INTERNAL MEDICINE

## 2024-02-21 PROCEDURE — 93458 L HRT ARTERY/VENTRICLE ANGIO: CPT | Performed by: INTERNAL MEDICINE

## 2024-02-21 PROCEDURE — 93005 ELECTROCARDIOGRAM TRACING: CPT | Performed by: INTERNAL MEDICINE

## 2024-02-21 PROCEDURE — 25010000002 MIDAZOLAM PER 1 MG: Performed by: INTERNAL MEDICINE

## 2024-02-21 PROCEDURE — 25010000002 HEPARIN (PORCINE) 25000-0.45 UT/250ML-% SOLUTION

## 2024-02-21 PROCEDURE — C1894 INTRO/SHEATH, NON-LASER: HCPCS | Performed by: INTERNAL MEDICINE

## 2024-02-21 PROCEDURE — 99232 SBSQ HOSP IP/OBS MODERATE 35: CPT | Performed by: STUDENT IN AN ORGANIZED HEALTH CARE EDUCATION/TRAINING PROGRAM

## 2024-02-21 PROCEDURE — 99232 SBSQ HOSP IP/OBS MODERATE 35: CPT | Performed by: INTERNAL MEDICINE

## 2024-02-21 PROCEDURE — 25510000001 IOPAMIDOL PER 1 ML: Performed by: INTERNAL MEDICINE

## 2024-02-21 PROCEDURE — 25010000002 HEPARIN (PORCINE) PER 1000 UNITS: Performed by: INTERNAL MEDICINE

## 2024-02-21 PROCEDURE — 85347 COAGULATION TIME ACTIVATED: CPT

## 2024-02-21 PROCEDURE — C1769 GUIDE WIRE: HCPCS | Performed by: INTERNAL MEDICINE

## 2024-02-21 PROCEDURE — 85520 HEPARIN ASSAY: CPT

## 2024-02-21 PROCEDURE — 80061 LIPID PANEL: CPT | Performed by: INTERNAL MEDICINE

## 2024-02-21 PROCEDURE — B2151ZZ FLUOROSCOPY OF LEFT HEART USING LOW OSMOLAR CONTRAST: ICD-10-PCS | Performed by: INTERNAL MEDICINE

## 2024-02-21 PROCEDURE — 80048 BASIC METABOLIC PNL TOTAL CA: CPT | Performed by: INTERNAL MEDICINE

## 2024-02-21 PROCEDURE — 25010000002 FENTANYL CITRATE (PF) 50 MCG/ML SOLUTION: Performed by: INTERNAL MEDICINE

## 2024-02-21 PROCEDURE — 25810000003 SODIUM CHLORIDE 0.9 % SOLUTION: Performed by: NURSE PRACTITIONER

## 2024-02-21 PROCEDURE — B2111ZZ FLUOROSCOPY OF MULTIPLE CORONARY ARTERIES USING LOW OSMOLAR CONTRAST: ICD-10-PCS | Performed by: INTERNAL MEDICINE

## 2024-02-21 PROCEDURE — 4A023N7 MEASUREMENT OF CARDIAC SAMPLING AND PRESSURE, LEFT HEART, PERCUTANEOUS APPROACH: ICD-10-PCS | Performed by: INTERNAL MEDICINE

## 2024-02-21 RX ORDER — LIDOCAINE HYDROCHLORIDE 10 MG/ML
INJECTION, SOLUTION EPIDURAL; INFILTRATION; INTRACAUDAL; PERINEURAL
Status: DISCONTINUED | OUTPATIENT
Start: 2024-02-21 | End: 2024-02-21 | Stop reason: HOSPADM

## 2024-02-21 RX ORDER — NITROGLYCERIN 0.4 MG/1
0.4 TABLET SUBLINGUAL
Status: DISCONTINUED | OUTPATIENT
Start: 2024-02-21 | End: 2024-02-22 | Stop reason: HOSPADM

## 2024-02-21 RX ORDER — FENTANYL CITRATE 50 UG/ML
INJECTION, SOLUTION INTRAMUSCULAR; INTRAVENOUS
Status: DISCONTINUED | OUTPATIENT
Start: 2024-02-21 | End: 2024-02-21 | Stop reason: HOSPADM

## 2024-02-21 RX ORDER — MIDAZOLAM HYDROCHLORIDE 1 MG/ML
INJECTION INTRAMUSCULAR; INTRAVENOUS
Status: DISCONTINUED | OUTPATIENT
Start: 2024-02-21 | End: 2024-02-21 | Stop reason: HOSPADM

## 2024-02-21 RX ORDER — HEPARIN SODIUM 1000 [USP'U]/ML
INJECTION, SOLUTION INTRAVENOUS; SUBCUTANEOUS
Status: DISCONTINUED | OUTPATIENT
Start: 2024-02-21 | End: 2024-02-21 | Stop reason: HOSPADM

## 2024-02-21 RX ADMIN — TICAGRELOR 90 MG: 90 TABLET ORAL at 20:10

## 2024-02-21 RX ADMIN — ASPIRIN 81 MG: 81 TABLET, COATED ORAL at 08:52

## 2024-02-21 RX ADMIN — BISOPROLOL FUMARATE 2.5 MG: 5 TABLET, FILM COATED ORAL at 08:53

## 2024-02-21 RX ADMIN — PANTOPRAZOLE SODIUM 40 MG: 40 INJECTION, POWDER, FOR SOLUTION INTRAVENOUS at 20:10

## 2024-02-21 RX ADMIN — HEPARIN SODIUM 17 UNITS/KG/HR: 10000 INJECTION, SOLUTION INTRAVENOUS at 13:19

## 2024-02-21 RX ADMIN — PANTOPRAZOLE SODIUM 40 MG: 40 INJECTION, POWDER, FOR SOLUTION INTRAVENOUS at 08:55

## 2024-02-21 RX ADMIN — DOCUSATE SODIUM 50MG AND SENNOSIDES 8.6MG 2 TABLET: 8.6; 5 TABLET, FILM COATED ORAL at 20:20

## 2024-02-21 RX ADMIN — SODIUM CHLORIDE 100 ML/HR: 9 INJECTION, SOLUTION INTRAVENOUS at 00:02

## 2024-02-21 RX ADMIN — Medication 10 ML: at 20:10

## 2024-02-21 RX ADMIN — ATORVASTATIN CALCIUM 40 MG: 40 TABLET, FILM COATED ORAL at 20:10

## 2024-02-21 RX ADMIN — TICAGRELOR 90 MG: 90 TABLET ORAL at 10:58

## 2024-02-21 RX ADMIN — Medication 10 MG: at 20:10

## 2024-02-21 RX ADMIN — Medication 10 ML: at 08:55

## 2024-02-21 NOTE — PROGRESS NOTES
Pt. Referred for Phase II Cardiac Rehab. Staff discussed benefits of exercise, program protocol, and educational material provided. Teach back verified.  Patient scheduled for orientation at [facility] on Thursday, March 7th 2024 at 1:00pm.

## 2024-02-21 NOTE — PROGRESS NOTES
HEPARIN INFUSION  Taqueria Madden is a  63 y.o. male receiving heparin infusion.     Therapy for (VTE/Cardiac):   Cardiac Protocol - ACS (Unstable Angina, Myocarditis)                  Continue with full-dose anticoagulation until Cath Lab 2/21  Patient Weight: 95.3 kg  Initial Bolus (Y/N):   Yes  Any Bolus (Y/N):   Yes     Signs or Symptoms of Bleeding: No S/Sx bleeding per RN  Cardiac or Other (Not VTE)   Initial Bolus: 60 units/kg (Max 4,000 units)  Initial rate: 12 units/kg/hr (Max 1,000 units/hr)   Anti Xa Rebolus Infusion Hold time Change infusion Dose (Units/kg/hr) Next Anti Xa or aPTT Level Due   < 0.11 50 Units/kg  (4000 Units Max) None Increase by  3 Units/kg/hr 6 hours   0.11- 0.19 25 Units/kg  (2000 Units Max) None Increase by  2 Units/kg/hr 6 hours   0.2 - 0.29 0 None Increase by  1 Units/kg/hr 6 hours   0.3 - 0.5 0 None No Change 6 hours (after 2 consecutive levels in range check qAM)   0.51 - 0.6 0 None Decrease by  1 Units/kg/hr 6 hours   0.61 - 0.8 0 30 Minutes Decrease by  2 Units/kg/hr 6 hours   0.81 - 1 0 60 Minutes Decrease by  3 Units/kg/hr 6 hours   >1 0 Hold  After Anti Xa less than 0.5 decrease previous rate by  4 Units/kg/hr  Every 2 hours until Anti Xa  less than 0.5 then when infusion restarts in 6 hours     Recommend Xa every 6 hours.   Results from last 7 days   Lab Units 02/21/24  0153 02/20/24  0615 02/19/24  1603   INR   --   --  1.04   HEMOGLOBIN g/dL 15.3 15.6 18.5*   HEMATOCRIT % 44.0 46.8 52.6*   PLATELETS 10*3/mm3 231 240 269        Date   Time   Anti-Xa Current Rate (Unit/kg/hr) Bolus   (Units) Rate Change   (Unit/kg/hr) New Rate (Unit/kg/hr) Next   Anti-Xa Comments  Pump Check Daily   2/19 16:03 0.10 ---new--- 4000  +11 11 00:00 Set up IV pump w/ RN. TBW, bolus, rate confirmed.    2/20 00:29 0.10 11 4000 +3 14 0800 D/w RN, heparin gtt has been running continuously without any issues   2/20 08:21 0.11 14 2000 +2 16 1600 DW RN; pump check   2/20   HELD FOR PROCEDURE -- +16 16 0200  JOSAIS RN   2/21 0530 0.29 16 -- +1 17 1200 D/w RN   2/21 0953 -- 17 -- -- 17 1200 Bag/ pump settings verified; cath lab today   2/21 1220 0.51 17 -- -- 17 2000 Josias SANTANA RN; To cath lab soon, follow for plan                                                                                                                                                               Sariah Hare, PharmD  2/21/2024  13:21 EST

## 2024-02-21 NOTE — PLAN OF CARE
Goal Outcome Evaluation:  Plan of Care Reviewed With: patient        Progress: improving  Outcome Evaluation: a/o x 4; VSS, RA; pleasant, no complaints overnight; heparin gtt and ns started during shift; hepx closely monitored with pharmacy; no s/s of bleeding TR band removed - R arm neurovascular checks WDL; no acute changes to report from overnight; will continue POC              Problem: Adult Inpatient Plan of Care  Goal: Absence of Hospital-Acquired Illness or Injury  Intervention: Identify and Manage Fall Risk  Recent Flowsheet Documentation  Taken 2/21/2024 0600 by Jaime Soto RN  Safety Promotion/Fall Prevention:   assistive device/personal items within reach   clutter free environment maintained   activity supervised   fall prevention program maintained   nonskid shoes/slippers when out of bed   safety round/check completed   room organization consistent  Taken 2/21/2024 0400 by Jaime Soto RN  Safety Promotion/Fall Prevention:   activity supervised   assistive device/personal items within reach   clutter free environment maintained   fall prevention program maintained   nonskid shoes/slippers when out of bed   safety round/check completed   room organization consistent  Taken 2/21/2024 0200 by Jaime Soto RN  Safety Promotion/Fall Prevention:   activity supervised   assistive device/personal items within reach   clutter free environment maintained   fall prevention program maintained   nonskid shoes/slippers when out of bed   room organization consistent   safety round/check completed  Taken 2/21/2024 0000 by Jaime Soto RN  Safety Promotion/Fall Prevention:   activity supervised   assistive device/personal items within reach   clutter free environment maintained   fall prevention program maintained   nonskid shoes/slippers when out of bed   room organization consistent   safety round/check completed  Taken 2/20/2024 2200 by Jaime Soto RN  Safety Promotion/Fall  Prevention:   assistive device/personal items within reach   clutter free environment maintained   activity supervised   fall prevention program maintained   nonskid shoes/slippers when out of bed   room organization consistent   safety round/check completed  Taken 2/20/2024 1953 by Jaime Soto RN  Safety Promotion/Fall Prevention:   assistive device/personal items within reach   clutter free environment maintained   activity supervised   fall prevention program maintained   nonskid shoes/slippers when out of bed   room organization consistent   safety round/check completed     Problem: Adult Inpatient Plan of Care  Goal: Absence of Hospital-Acquired Illness or Injury  Intervention: Prevent Skin Injury  Recent Flowsheet Documentation  Taken 2/21/2024 0600 by Jaime Soto RN  Body Position: position changed independently  Skin Protection:   adhesive use limited   transparent dressing maintained   tubing/devices free from skin contact  Taken 2/21/2024 0400 by Jaime Soto RN  Body Position: position changed independently  Skin Protection:   adhesive use limited   transparent dressing maintained   tubing/devices free from skin contact  Taken 2/21/2024 0200 by Jaime Soto RN  Body Position: position changed independently  Skin Protection:   adhesive use limited   transparent dressing maintained   tubing/devices free from skin contact   skin-to-device areas padded  Taken 2/21/2024 0000 by Jaime Soto RN  Body Position: position changed independently  Skin Protection:   adhesive use limited   transparent dressing maintained   tubing/devices free from skin contact   skin-to-device areas padded  Taken 2/20/2024 2200 by Jaime Soto RN  Body Position: position changed independently  Skin Protection:   adhesive use limited   transparent dressing maintained   tubing/devices free from skin contact  Taken 2/20/2024 1953 by Jaime Soto RN  Body Position: position changed  independently  Skin Protection:   adhesive use limited   incontinence pads utilized   tubing/devices free from skin contact   transparent dressing maintained

## 2024-02-21 NOTE — PROGRESS NOTES
[facility] Medicine Services  PROGRESS NOTE    Patient Name: Taqueria Madden  : 1960  MRN: [id number]    Date of Admission: 2024  Primary Care Physician: Liya Rubio APRN    Subjective   Subjective     CC: f/u cp    HPI: resting comfortably, no acute overnight events. Denies CP/palpitations currently      Objective   Objective     Vital Signs:   Temp:  [97.5 °F (36.4 °C)-98.3 °F (36.8 °C)] 97.5 °F (36.4 °C)  Heart Rate:  [] 72  Resp:  [18-20] 18  BP: (105-151)/() 105/74     Physical Exam:  Constitutional: No acute distress, awake, alert  HENT: NCAT, mucous membranes moist  Respiratory: Clear to auscultation bilaterally, respiratory effort normal   Cardiovascular: RRR, no murmurs, rubs, or gallops  Gastrointestinal: Positive bowel sounds, soft, nontender, nondistended  Musculoskeletal: No bilateral ankle edema  Psychiatric: Appropriate affect, cooperative  Neurologic: Oriented x 3, strength symmetric in all extremities, Cranial Nerves grossly intact to confrontation, speech clear  Skin: No rashes     Results Reviewed:  LAB RESULTS:      Lab 24  1220 24  0153 24  1954 24  0821 24  0615 24  0029 24  1603   WBC  --  9.02  --   --  7.88  --  8.28   HEMOGLOBIN  --  15.3  --   --  15.6  --  18.5*   HEMATOCRIT  --  44.0  --   --  46.8  --  52.6*   PLATELETS  --  231  --   --  240  --  269   NEUTROS ABS  --   --   --   --  4.74  --  5.36   IMMATURE GRANS (ABS)  --   --   --   --  0.04  --  0.04   LYMPHS ABS  --   --   --   --  2.25  --  1.98   MONOS ABS  --   --   --   --  0.74  --  0.75   EOS ABS  --   --   --   --  0.07  --  0.11   MCV  --  86.8  --   --  86.7  --  86.7   PROCALCITONIN  --   --   --   --   --   --  0.11   LACTATE  --   --   --   --   --   --  1.5   PROTIME  --   --   --   --   --   --  13.7   APTT  --   --   --   --   --   --  30.7*   HEPARIN ANTI-XA 0.51 0.29* 0.10* 0.11*  --  0.10* 0.10*   D DIMER QUANT  --    --   --   --   --   --  <0.27         Lab 02/21/24  0153 02/20/24  0615 02/19/24  1603   SODIUM 135* 138 134*   POTASSIUM 4.0 4.0 3.5   CHLORIDE 103 105 99   CO2 23.0 21.0* 23.0   ANION GAP 9.0 12.0 12.0   BUN 12 13 13   CREATININE 0.81 0.83 0.87   EGFR 99.1 98.3 97.0   GLUCOSE 104* 117* 136*   CALCIUM 8.4* 8.5* 9.7   MAGNESIUM  --  2.2  --    HEMOGLOBIN A1C  --  5.60  --    TSH  --  1.910  --          Lab 02/19/24  1603   TOTAL PROTEIN 7.9   ALBUMIN 4.5   GLOBULIN 3.4   ALT (SGPT) 35   AST (SGOT) 40   BILIRUBIN 1.7*   ALK PHOS 81   LIPASE 20         Lab 02/20/24  0615 02/20/24  0029 02/19/24  1844 02/19/24  1603   PROBNP  --   --   --  788.3   HSTROP T 1,543* 1,511* 1,331* 1,220*   PROTIME  --   --   --  13.7   INR  --   --   --  1.04         Lab 02/21/24  0153 02/20/24  0615   CHOLESTEROL 120 137   LDL CHOL 75 88   HDL CHOL 28* 30*   TRIGLYCERIDES 85 100             Brief Urine Lab Results       None            Microbiology Results Abnormal       None            Adult Transthoracic Echo Complete W/ Cont if Necessary Per Protocol    Result Date: 2/20/2024    Left ventricular systolic function is normal. Estimated left ventricular EF = 60%   The cardiac valves are anatomically and functionarmal.     Cardiac Catheterization/Vascular Study    Result Date: 2/20/2024    Occluded proximal RCA.  Large thrombus burden treated with PTCA, and penumbra CAT Rx aspiration/mechanical thrombectomy.  Residual 50% large thrombotic lesion but PRINCESS grade III flow in the distal vessel.   Essentially normal left coronary artery angiographically   LVEF 55%     CT Chest Without Contrast Diagnostic    Result Date: 2/19/2024  CT CHEST WO CONTRAST DIAGNOSTIC Date of Exam: 2/19/2024 7:47 PM EST Indication: Chest pain, nonspecific pain after vomiting. Comparison: None available. Technique: Axial CT images were obtained of the chest without contrast administration.  Reconstructed coronal and sagittal images were also obtained. Automated  exposure control and iterative construction methods were used. Findings: The thyroid gland is normal. The subglottic airway is clear. Calcified granuloma within the right upper lobe. Right hilar calcified lymph nodes. The left lung is clear. No consolidation. No pneumothorax or pleural effusion. Adenopathy. Mild atheromatous disease of the coronary vessels. Partially visualized likely left renal cyst. Calcified granulomata within the spleen. Otherwise the visualized upper abdomen is normal.     Impression: No rib fractures. No acute cardiopulmonary disease is noted. Electronically Signed: Conor Mitchell MD  2/19/2024 8:49 PM EST  Workstation ID: [id number]    XR Chest 1 View    Result Date: 2/19/2024  XR CHEST 1 VW Date of Exam: 2/19/2024 5:06 PM EST Indication: Chest Pain Triage Protocol Comparison: None available. Findings: No focal consolidation. No pneumothorax or pleural effusion. Cardiac size is normal. The visualized clavicles appear intact. No displaced rib fractures. The visualized upper abdomen is normal.     Impression: Impression: No acute cardiopulmonary disease. Electronically Signed: Conor Mitchell MD  2/19/2024 5:43 PM EST  Workstation ID: [id number]     Results for orders placed during the hospital encounter of 02/19/24    Adult Transthoracic Echo Complete W/ Cont if Necessary Per Protocol    Interpretation Summary    Left ventricular systolic function is normal. Estimated left ventricular EF = 60%    The cardiac valves are anatomically and functionarmal.      Current medications:  Scheduled Meds:aspirin, 324 mg, Oral, Once  aspirin, 81 mg, Oral, Daily  atorvastatin, 40 mg, Oral, Nightly  bisoprolol, 2.5 mg, Oral, Q24H  pantoprazole, 40 mg, Intravenous, Q12H  pharmacy consult - [place], , Does not apply, Daily  sodium chloride, 1,000 mL, Intravenous, Once  sodium chloride, 10 mL, Intravenous, Q12H  ticagrelor, 90 mg, Oral, Q12H      Continuous Infusions:heparin, 17 Units/kg/hr, Last Rate: 17 Units/kg/hr  (02/21/24 1319)  nitroglycerin, 5-200 mcg/min, Last Rate: 15 mcg/min (02/20/24 0123)  Pharmacy to Dose Heparin,   sodium chloride, 100 mL/hr, Last Rate: 100 mL/hr (02/21/24 0002)      PRN Meds:.  acetaminophen    ALPRAZolam    senna-docusate sodium **AND** polyethylene glycol **AND** bisacodyl **AND** bisacodyl    Calcium Replacement - Follow Nurse / BPA Driven Protocol    diphenhydrAMINE    Magnesium Standard Dose Replacement - Follow Nurse / BPA Driven Protocol    melatonin    [DISCONTINUED] Morphine **AND** naloxone    nitroglycerin    ondansetron    Pharmacy to Dose Heparin    Phosphorus Replacement - Follow Nurse / BPA Driven Protocol    Potassium Replacement - Follow Nurse / BPA Driven Protocol    sodium chloride    sodium chloride    sodium chloride    Assessment & Plan   Assessment & Plan     Active Hospital Problems    Diagnosis  POA    **Unstable angina [I20.0]  Yes    Hypertension [I10]  Yes    Hyperlipidemia [E78.5]  Yes    Hyponatremia [E87.1]  Yes    Hyperglycemia [R73.9]  Yes    NSTEMI (non-ST elevated myocardial infarction) [I21.4]  Yes      Resolved Hospital Problems   No resolved problems to display.        Brief Hospital Course to date:  Taqueria Madden is a 63 y.o. male with a history of hypertension, hyperlipidemia, presents to the ED with complaints of chest pain and markedly ABNORMAL EKG and troponin    NSTEMI  -- Chest x-ray shows no acute cardiopulmonary disease. CT chest neg.   -- Cardiology follows, [facility] 2/20, showed occluded proximal RCA, large thrombus burden treated with PTCA.  Had residual 50% large thrombotic lesion.  Relook angiography with possible stenting today  -- cont ASA, ticagrelor BID for at least 12 mo, statin, bisoprolol  --needs cardiac rehab     Hypertension  Hyperlipidemia  -- Continue statin  -- Started bisoprolol as above.     Mild hyponatremia, not significant  -- Resolved after IVF.      Hyperglycemia  Obesity    Expected Discharge Location and Transportation:    Expected Discharge   Expected Discharge Date: 2/22/2024; Expected Discharge Time:      DVT prophylaxis:  Medical DVT prophylaxis orders are present.         AM-PAC 6 Clicks Score (PT): 24 (02/20/24 2200)    CODE STATUS:   Code Status and Medical Interventions:   Ordered at: 02/19/24 2007     Level Of Support Discussed With:    Patient     Code Status (Patient has no pulse and is not breathing):    CPR (Attempt to Resuscitate)     Medical Interventions (Patient has pulse or is breathing):    Full Support       Amber Dey MD  02/21/24

## 2024-02-21 NOTE — PROGRESS NOTES
"[facility] at [facility]  Inpatient progress note     LOS: 2 days   Patient Care Team:  Liya Rubio APRN as PCP - General (Family Medicine)    Chief Complaint: Chest pain    Problem List:  NSTEMI  Angiogram 2/20/2024 with heavy RCA thrombus, treated with thrombectomy and anticoagulation  Hypertension  Dyslipidemia  Surgeries:  Eye surgery  Vasectomy     Subjective     History of Present Illness    Feeling well, no events overnight. Denies chest pain, dyspnea. No pain at right radial access site.     Objective     Vital Signs    Vital Signs Range for the last 24 hours  Temp:  [97.9 °F (36.6 °C)-98.3 °F (36.8 °C)] 97.9 °F (36.6 °C)   Temp src: Oral   BP: (105-151)/() 116/81   Heart Rate:  [] 82   Resp:  [18-20] 18   SpO2:  [91 %-96 %] 93 %       Device (Oxygen Therapy): room air           Flowsheet Rows      Flowsheet Row First Filed Value   Admission Height 177.8 cm (70\") Documented at 02/19/2024 1547   Admission Weight 95.3 kg (210 lb) Documented at 02/19/2024 1547            Objective:  General Appearance:  Comfortable and in no acute distress.    Vital signs: (most recent): Blood pressure 107/75, pulse 97, temperature 97.9 °F (36.6 °C), temperature source Oral, resp. rate 18, height 177.8 cm (70\"), weight 95.3 kg (210 lb), SpO2 93%.  Vital signs are normal.    Output: Producing urine.    Lungs:  Normal effort and normal respiratory rate.    Heart: Normal rate.  Regular rhythm.  S1 normal and S2 normal.  No murmur, gallop or friction rub.   Pulses: Distal pulses are intact.    Neurological: Patient is alert and oriented to person, place and time.    Skin:  Warm and dry.              CBC          2/19/2024    16:03 2/20/2024    06:15 2/21/2024    01:53   CBC   WBC 8.28  7.88  9.02    RBC 6.07  5.40  5.07    Hemoglobin 18.5  15.6  15.3    Hematocrit 52.6  46.8  44.0    MCV 86.7  86.7  86.8    MCH 30.5  28.9  30.2    MCHC 35.2  33.3  34.8    RDW 12.8  12.7  13.0    Platelets 269  240  " 231      CMP          2/19/2024    16:03 2/20/2024    06:15 2/21/2024    01:53   CMP   Glucose 136  117  104    BUN 13  13  12    Creatinine 0.87  0.83  0.81    EGFR 97.0  98.3  99.1    Sodium 134  138  135    Potassium 3.5  4.0  4.0    Chloride 99  105  103    Calcium 9.7  8.5  8.4    Total Protein 7.9      Albumin 4.5      Globulin 3.4      Total Bilirubin 1.7      Alkaline Phosphatase 81      AST (SGOT) 40      ALT (SGPT) 35      Albumin/Globulin Ratio 1.3      BUN/Creatinine Ratio 14.9  15.7  14.8    Anion Gap 12.0  12.0  9.0      CARDIAC LABS:      Lab 02/20/24  0615 02/20/24  0029 02/19/24  1844 02/19/24  1603   PROBNP  --   --   --  788.3   HSTROP T 1,543* 1,511* 1,331* 1,220*   PROTIME  --   --   --  13.7   INR  --   --   --  1.04     Coronary angiogram/PCI (2/20/2024)    Occluded proximal RCA.  Large thrombus burden treated with PTCA, and penumbra CAT Rx aspiration/mechanical thrombectomy.  Residual 50% large thrombotic lesion but PRINCESS grade III flow in the distal vessel.    Essentially normal left coronary artery angiographically    LVEF 55%    Echocardiogram (2/20/2024)    Left ventricular systolic function is normal. Estimated left ventricular EF = 60%    The cardiac valves are anatomically and functionarmal.       Results Review:     I reviewed the patient's new clinical results.      Medication Review:     acetaminophen    ALPRAZolam    aspirin    aspirin    atorvastatin    senna-docusate sodium **AND** polyethylene glycol **AND** bisacodyl **AND** bisacodyl    bisoprolol    Calcium Replacement - Follow Nurse / BPA Driven Protocol    diphenhydrAMINE    heparin    Magnesium Standard Dose Replacement - Follow Nurse / BPA Driven Protocol    melatonin    [DISCONTINUED] Morphine **AND** naloxone    nitroglycerin    nitroglycerin    ondansetron    pantoprazole    pharmacy consult - [place]    Pharmacy to Dose Heparin    Phosphorus Replacement - Follow Nurse / BPA Driven Protocol    Potassium Replacement - Follow  Nurse / BPA Driven Protocol    sodium chloride    sodium chloride    sodium chloride    sodium chloride    sodium chloride    sodium chloride    ticagrelor      Assessment & Plan       Unstable angina    Hypertension    Hyperlipidemia    Hyponatremia    Hyperglycemia    NSTEMI (non-ST elevated myocardial infarction)  Type 1 MI due to thrombus    Patient presented with NSTEMI, now s/p mechanical thrombectomy 2/20/2024 to RCA by Dr. Alcantara. EF normal. Plan to return to lab today for RCA intervention. Chest pain free.    - Continue ASA, ticag 90mg BID for at least 12 months  - Continue atorvastatin  - Continue bisoprolol  - Cardiac rehab  - PCI to RCA today. Risks, benefits discussed and patient wishes to proceed.    Jerad Keller MD, [facility]  Interventional Cardiology  [facility]

## 2024-02-21 NOTE — CASE MANAGEMENT/SOCIAL WORK
Continued Stay Note  [facility]     Patient Name: Taqueria Madden  MRN: [id number]  Today's Date: 2/21/2024    Admit Date: 2/19/2024    Plan: Home with wife   Discharge Plan       Row Name 02/21/24 1100       Plan    Plan Home with wife    Patient/Family in Agreement with Plan yes    Plan Comments Discussed patient in MDR. Spoke to patient at bedside. Heparin drip. PCI this afternoon. His plan is home with wife to transport. CM will continue to follow.    Final Discharge Disposition Code 01 - home or self-care                   Discharge Codes    No documentation.                 Expected Discharge Date and Time       Expected Discharge Date Expected Discharge Time    Feb 21, 2024               Marilee Sims RN

## 2024-02-22 ENCOUNTER — NURSE TRIAGE (OUTPATIENT)
Dept: CALL CENTER | Facility: HOSPITAL | Age: 64
End: 2024-02-22
Payer: COMMERCIAL

## 2024-02-22 ENCOUNTER — READMISSION MANAGEMENT (OUTPATIENT)
Dept: CALL CENTER | Facility: HOSPITAL | Age: 64
End: 2024-02-22
Payer: COMMERCIAL

## 2024-02-22 VITALS
BODY MASS INDEX: 30.06 KG/M2 | WEIGHT: 210 LBS | OXYGEN SATURATION: 96 % | RESPIRATION RATE: 18 BRPM | HEART RATE: 65 BPM | SYSTOLIC BLOOD PRESSURE: 119 MMHG | DIASTOLIC BLOOD PRESSURE: 81 MMHG | HEIGHT: 70 IN | TEMPERATURE: 98.3 F

## 2024-02-22 LAB
ACT BLD: 363 SECONDS (ref 82–152)
ANION GAP SERPL CALCULATED.3IONS-SCNC: 12 MMOL/L (ref 5–15)
BUN SERPL-MCNC: 12 MG/DL (ref 8–23)
BUN/CREAT SERPL: 15.8 (ref 7–25)
CALCIUM SPEC-SCNC: 8.7 MG/DL (ref 8.6–10.5)
CHLORIDE SERPL-SCNC: 105 MMOL/L (ref 98–107)
CO2 SERPL-SCNC: 22 MMOL/L (ref 22–29)
CREAT SERPL-MCNC: 0.76 MG/DL (ref 0.76–1.27)
EGFRCR SERPLBLD CKD-EPI 2021: 101 ML/MIN/1.73
GLUCOSE SERPL-MCNC: 98 MG/DL (ref 65–99)
POTASSIUM SERPL-SCNC: 4 MMOL/L (ref 3.5–5.2)
SODIUM SERPL-SCNC: 139 MMOL/L (ref 136–145)

## 2024-02-22 PROCEDURE — 80048 BASIC METABOLIC PNL TOTAL CA: CPT | Performed by: INTERNAL MEDICINE

## 2024-02-22 PROCEDURE — 99239 HOSP IP/OBS DSCHRG MGMT >30: CPT | Performed by: INTERNAL MEDICINE

## 2024-02-22 PROCEDURE — 99232 SBSQ HOSP IP/OBS MODERATE 35: CPT | Performed by: INTERNAL MEDICINE

## 2024-02-22 RX ORDER — PANTOPRAZOLE SODIUM 40 MG/1
40 TABLET, DELAYED RELEASE ORAL
Status: DISCONTINUED | OUTPATIENT
Start: 2024-02-22 | End: 2024-02-22 | Stop reason: HOSPADM

## 2024-02-22 RX ORDER — ASPIRIN 81 MG/1
81 TABLET ORAL DAILY
Start: 2024-02-23

## 2024-02-22 RX ORDER — BISOPROLOL FUMARATE 5 MG/1
2.5 TABLET, FILM COATED ORAL
Qty: 45 TABLET | Refills: 3 | Status: SHIPPED | OUTPATIENT
Start: 2024-02-23

## 2024-02-22 RX ORDER — ATORVASTATIN CALCIUM 40 MG/1
40 TABLET, FILM COATED ORAL NIGHTLY
Qty: 90 TABLET | Refills: 3 | Status: SHIPPED | OUTPATIENT
Start: 2024-02-22

## 2024-02-22 RX ADMIN — BISOPROLOL FUMARATE 2.5 MG: 5 TABLET, FILM COATED ORAL at 09:35

## 2024-02-22 RX ADMIN — PANTOPRAZOLE SODIUM 40 MG: 40 TABLET, DELAYED RELEASE ORAL at 09:35

## 2024-02-22 RX ADMIN — TICAGRELOR 90 MG: 90 TABLET ORAL at 09:35

## 2024-02-22 RX ADMIN — ASPIRIN 81 MG: 81 TABLET, COATED ORAL at 09:36

## 2024-02-22 NOTE — DISCHARGE SUMMARY
[facility] Medicine Services  DISCHARGE SUMMARY    Patient Name: Taqueria Madden  : 1960  MRN: [id number]    Date of Admission: 2024  4:57 PM  Date of Discharge:  2024  Primary Care Physician: Liya Rubio APRN    Consults       Date and Time Order Name Status Description    2024  8:21 PM Inpatient Cardiology Consult Completed             Hospital Course     Presenting Problem: NSTEMI    Active Hospital Problems    Diagnosis  POA    **Unstable angina [I20.0]  Yes    Hypertension [I10]  Yes    Hyperlipidemia [E78.5]  Yes    Hyponatremia [E87.1]  Yes    Hyperglycemia [R73.9]  Yes    NSTEMI (non-ST elevated myocardial infarction) [I21.4]  Yes      Resolved Hospital Problems   No resolved problems to display.          Hospital Course:  Taqueria Madden is a 63 y.o. male with a history of hypertension, hyperlipidemia, presents to the ED with complaints of chest pain and markedly ABNORMAL EKG and troponin     NSTEMI  -- Chest x-ray shows no acute cardiopulmonary disease. CT chest neg.   -- Cardiology follows, [facility] , showed occluded proximal RCA, large thrombus burden treated with PTCA.  Had residual 50% large thrombotic lesion.    -s/p Relook angiography on  with again visualized reoccluded proximal RCA but given that patient was asymptomatic and chest pain-free, further intervention was aborted  -- cont ASA, ticagrelor BID for at least 12 months, statin, bisoprolol  --needs cardiac rehab     Hypertension  Hyperlipidemia  -- Continue statin  -- Continue bisoprolol per cardiology     Mild hyponatremia, not significant  -- Resolved after IVF.      Hyperglycemia  Obesity      Discharge Follow Up Recommendations for outpatient labs/diagnostics:  PCP in 1 week  Dr. Alcantara in 3 weeks    Day of Discharge     HPI:   Doing okay, no chest pain.  Wife at bedside    Review of Systems  Gen- No fevers, chills  CV- No chest pain, palpitations  Resp- No cough, dyspnea  GI- No  N/V/D, abd pain      Vital Signs:   Temp:  [97.9 °F (36.6 °C)-98.6 °F (37 °C)] 98.3 °F (36.8 °C)  Heart Rate:  [65-88] 65  Resp:  [14-18] 18  BP: ()/(64-87) 119/81  Flow (L/min):  [2] 2      Physical Exam:  Constitutional: No acute distress, awake, alert  HENT: NCAT, mucous membranes moist  Respiratory: Respiratory effort normal   Cardiovascular: RRR, no murmurs, rubs, or gallops  Musculoskeletal: No bilateral ankle edema  Psychiatric: Appropriate affect, cooperative  Neurologic: Oriented x 3, speech clear  Skin: No rashes      Pertinent  and/or Most Recent Results     LAB RESULTS:      Lab 02/21/24  1220 02/21/24  0153 02/20/24  1954 02/20/24  0821 02/20/24  0615 02/20/24  0029 02/19/24  1603   WBC  --  9.02  --   --  7.88  --  8.28   HEMOGLOBIN  --  15.3  --   --  15.6  --  18.5*   HEMATOCRIT  --  44.0  --   --  46.8  --  52.6*   PLATELETS  --  231  --   --  240  --  269   NEUTROS ABS  --   --   --   --  4.74  --  5.36   IMMATURE GRANS (ABS)  --   --   --   --  0.04  --  0.04   LYMPHS ABS  --   --   --   --  2.25  --  1.98   MONOS ABS  --   --   --   --  0.74  --  0.75   EOS ABS  --   --   --   --  0.07  --  0.11   MCV  --  86.8  --   --  86.7  --  86.7   PROCALCITONIN  --   --   --   --   --   --  0.11   LACTATE  --   --   --   --   --   --  1.5   PROTIME  --   --   --   --   --   --  13.7   APTT  --   --   --   --   --   --  30.7*   HEPARIN ANTI-XA 0.51 0.29* 0.10* 0.11*  --  0.10* 0.10*   D DIMER QUANT  --   --   --   --   --   --  <0.27         Lab 02/22/24  0530 02/21/24  0153 02/20/24  0615 02/19/24  1603   SODIUM 139 135* 138 134*   POTASSIUM 4.0 4.0 4.0 3.5   CHLORIDE 105 103 105 99   CO2 22.0 23.0 21.0* 23.0   ANION GAP 12.0 9.0 12.0 12.0   BUN 12 12 13 13   CREATININE 0.76 0.81 0.83 0.87   EGFR 101.0 99.1 98.3 97.0   GLUCOSE 98 104* 117* 136*   CALCIUM 8.7 8.4* 8.5* 9.7   MAGNESIUM  --   --  2.2  --    HEMOGLOBIN A1C  --   --  5.60  --    TSH  --   --  1.910  --          Lab 02/19/24  1603   TOTAL  PROTEIN 7.9   ALBUMIN 4.5   GLOBULIN 3.4   ALT (SGPT) 35   AST (SGOT) 40   BILIRUBIN 1.7*   ALK PHOS 81   LIPASE 20         Lab 02/20/24  0615 02/20/24  0029 02/19/24  1844 02/19/24  1603   PROBNP  --   --   --  788.3   HSTROP T 1,543* 1,511* 1,331* 1,220*   PROTIME  --   --   --  13.7   INR  --   --   --  1.04         Lab 02/21/24  0153 02/20/24  0615   CHOLESTEROL 120 137   LDL CHOL 75 88   HDL CHOL 28* 30*   TRIGLYCERIDES 85 100             Brief Urine Lab Results       None          Microbiology Results (last 10 days)       ** No results found for the last 240 hours. **            Cardiac Catheterization/Vascular Study    Result Date: 2/21/2024    Re-occluded proximal RCA   Normal left sided system with new left-to-right collaterals   High normal LVEDP of 16mmHg   Given patient is chest pain free with preserved cardiac function, further interventions were aborted.     Adult Transthoracic Echo Complete W/ Cont if Necessary Per Protocol    Result Date: 2/20/2024    Left ventricular systolic function is normal. Estimated left ventricular EF = 60%   The cardiac valves are anatomically and functionarmal.     Cardiac Catheterization/Vascular Study    Result Date: 2/20/2024    Occluded proximal RCA.  Large thrombus burden treated with PTCA, and penumbra CAT Rx aspiration/mechanical thrombectomy.  Residual 50% large thrombotic lesion but PRINCESS grade III flow in the distal vessel.   Essentially normal left coronary artery angiographically   LVEF 55%     CT Chest Without Contrast Diagnostic    Result Date: 2/19/2024  CT CHEST WO CONTRAST DIAGNOSTIC Date of Exam: 2/19/2024 7:47 PM EST Indication: Chest pain, nonspecific pain after vomiting. Comparison: None available. Technique: Axial CT images were obtained of the chest without contrast administration.  Reconstructed coronal and sagittal images were also obtained. Automated exposure control and iterative construction methods were used. Findings: The thyroid gland is  normal. The subglottic airway is clear. Calcified granuloma within the right upper lobe. Right hilar calcified lymph nodes. The left lung is clear. No consolidation. No pneumothorax or pleural effusion. Adenopathy. Mild atheromatous disease of the coronary vessels. Partially visualized likely left renal cyst. Calcified granulomata within the spleen. Otherwise the visualized upper abdomen is normal.     No rib fractures. No acute cardiopulmonary disease is noted. Electronically Signed: Conor Mitchell MD  2/19/2024 8:49 PM EST  Workstation ID: [id number]    XR Chest 1 View    Result Date: 2/19/2024  XR CHEST 1 VW Date of Exam: 2/19/2024 5:06 PM EST Indication: Chest Pain Triage Protocol Comparison: None available. Findings: No focal consolidation. No pneumothorax or pleural effusion. Cardiac size is normal. The visualized clavicles appear intact. No displaced rib fractures. The visualized upper abdomen is normal.     Impression: No acute cardiopulmonary disease. Electronically Signed: Conor Mitchell MD  2/19/2024 5:43 PM EST  Workstation ID: [id number]             Results for orders placed during the hospital encounter of 02/19/24    Adult Transthoracic Echo Complete W/ Cont if Necessary Per Protocol    Interpretation Summary    Left ventricular systolic function is normal. Estimated left ventricular EF = 60%    The cardiac valves are anatomically and functionarmal.      Plan for Follow-up of Pending Labs/Results:     Discharge Details        Discharge Medications        New Medications        Instructions Start Date   aspirin 81 MG EC tablet   81 mg, Oral, Daily   Start Date: February 23, 2024     bisoprolol 5 MG tablet  Commonly known as: ZEBeta   2.5 mg, Oral, Every 24 Hours Scheduled   Start Date: February 23, 2024     ticagrelor 90 MG tablet tablet  Commonly known as: BRILINTA   90 mg, Oral, Every 12 Hours Scheduled             Changes to Medications        Instructions Start Date   atorvastatin 40 MG tablet  Commonly  known as: LIPITOR  What changed:   medication strength  how much to take  when to take this   40 mg, Oral, Nightly             Continue These Medications        Instructions Start Date   Melatonin 10 MG tablet   1 tablet, Oral, Nightly               No Known Allergies      Discharge Disposition:  Home or Self Care    Diet:  Hospital:  Diet Order   Procedures    Diet: Cardiac Diets, Diabetic Diets; Healthy Heart (2-3 Na+); Consistent Carbohydrate; Texture: Regular Texture (IDDSI 7); Fluid Consistency: Thin (IDDSI 0)            Activity:      Restrictions or Other Recommendations:  Continue Brilinta       CODE STATUS:    Code Status and Medical Interventions:   Ordered at: 02/19/24 2007     Level Of Support Discussed With:    Patient     Code Status (Patient has no pulse and is not breathing):    CPR (Attempt to Resuscitate)     Medical Interventions (Patient has pulse or is breathing):    Full Support       Future Appointments   Date Time Provider Department Center   3/7/2024  1:00 PM [facility]   4/29/2024  2:30 PM Braeden Alcantara MD [facility]       Additional Instructions for the Follow-ups that You Need to Schedule       Discharge Follow-up with PCP   As directed       Currently Documented PCP:    Liya Rubio APRN    PCP Phone Number:    [phone]     Follow Up Details: 1 week        Discharge Follow-up with Specialty: Maricruz/karolyn; 3 Weeks   As directed      Specialty: Maricruz/clemauriceer   Follow Up: 3 Weeks                      Chanel Ignacio DO  02/22/24      Time Spent on Discharge:  I spent  36  minutes on this discharge activity which included: face-to-face encounter with the patient, reviewing the data in the system, coordination of the care with the nursing staff as well as consultants, documentation, and entering orders.

## 2024-02-22 NOTE — CASE MANAGEMENT/SOCIAL WORK
Case Management Discharge Note      Final Note: Patient is discharging today. His plan is home with wife to transport. No needs noted.         Selected Continued Care - Admitted Since 2/19/2024       Destination    No services have been selected for the patient.                Durable Medical Equipment    No services have been selected for the patient.                Dialysis/Infusion    No services have been selected for the patient.                Home Medical Care    No services have been selected for the patient.                Therapy    No services have been selected for the patient.                Community Resources    No services have been selected for the patient.                Community & DME    No services have been selected for the patient.                         Final Discharge Disposition Code: 01 - home or self-care

## 2024-02-22 NOTE — TELEPHONE ENCOUNTER
bisoprolol (ZEBeta) 5 MG tablet [[id number]]    Order Details  Dose: 2.5 mg Route: Oral Frequency: Every 24 Hours Scheduled   Dispense Quantity: 45 tablet Refills: 3 Fills remainin         Sig: Take 0.5 tablets by mouth Daily.         Start Date: 24 End Date: --   Written Date: 24 Expiration Date: 25   Providers      Ordering Provider and Authorizing Provider:  Braeden Alcantara MD  [facility] [place] [address]  Phone: [phone]   Fax: [phone]  NPI: [id number]        Ordering User: Braeden Alcantara MD      atorvastatin (LIPITOR) 40 MG tablet [[id number]]    Order Details  Dose: 40 mg Route: Oral Frequency: Nightly   Dispense Quantity: 90 tablet Refills: 3 Fills remainin         Sig: Take 1 tablet by mouth Every Night.         Start Date: 24 End Date: --   Written Date: 24 Expiration Date: 25   Providers    Ordering Provider and Authorizing Provider:  Braeden Alcantara MD [address] [place] [address]  Phone: [phone]   Fax: [phone]  NPI: [id number]        Ordering User: Braeden Alcantara MD          ticagrelor (BRILINTA) 90 MG tablet tablet [[id number]]    Order Details  Dose: 90 mg Route: Oral Frequency: Every 12 Hours Scheduled   Dispense Quantity: 180 tablet Refills: 3 Fills remainin         Sig: Take 1 tablet by mouth Every 12 (Twelve) Hours.         Start Date: 24 End Date: --   Written Date: 24 Expiration Date: 25   Providers      Ordering Provider and Authorizing Provider:  Braeden Alcantara MD  [facility] [place] [address]  Phone: [phone]   Fax: [phone]  NPI: [id number]         Called pharmacy and spoke with Bobby Castro

## 2024-02-22 NOTE — PROGRESS NOTES
"  [facility] at [facility]   Inpatient Progress Note       LOS: 3 days   Patient Care Team:  Liya Rubio APRN as PCP - General (Family Medicine)    Chief Complaint:  follow-up for NSTEMI    Subjective     Interval History:     No cardiovascular complaints.  Ambulating without chest pain.    Review of Systems:   Pertinent positives noted in history, exam, and assessment. Otherwise reviewed and negative.      Objective     Vitals:  Blood pressure 108/86, pulse 80, temperature 98.3 °F (36.8 °C), temperature source Oral, resp. rate 16, height 177.8 cm (70\"), weight 95.3 kg (210 lb), SpO2 96%.     Intake/Output Summary (Last 24 hours) at 2/22/2024 0914  Last data filed at 2/21/2024 2000  Gross per 24 hour   Intake 240 ml   Output --   Net 240 ml     Vitals reviewed.   Constitutional:       Appearance: Well-developed and not in distress.   Neck:      Thyroid: No thyromegaly.      Vascular: No carotid bruit or JVD.   Pulmonary:      Breath sounds: Normal breath sounds.   Cardiovascular:      Regular rhythm.      No gallop. No S3 and S4 gallop.   Pulses:     Intact distal pulses.      Carotid: 2+ bilaterally.     Radial: 2+ bilaterally.  Edema:     Peripheral edema absent.   Abdominal:      General: Bowel sounds are normal.      Palpations: Abdomen is soft. There is no abdominal mass.      Tenderness: There is no abdominal tenderness.   Musculoskeletal:         General: No deformity.      Extremities: No clubbing present.Skin:     General: Skin is warm and dry.      Findings: No rash.   Neurological:      Mental Status: Alert and oriented to person, place, and time.            Results Review:     I reviewed the patient's new clinical results.    Results from last 7 days   Lab Units 02/21/24  0153   WBC 10*3/mm3 9.02   HEMOGLOBIN g/dL 15.3   HEMATOCRIT % 44.0   PLATELETS 10*3/mm3 231     Results from last 7 days   Lab Units 02/22/24  0530 02/20/24  0615 02/19/24  1603   SODIUM mmol/L 139   < > 134* "   POTASSIUM mmol/L 4.0   < > 3.5   CHLORIDE mmol/L 105   < > 99   CO2 mmol/L 22.0   < > 23.0   BUN mg/dL 12   < > 13   CREATININE mg/dL 0.76   < > 0.87   CALCIUM mg/dL 8.7   < > 9.7   BILIRUBIN mg/dL  --   --  1.7*   ALK PHOS U/L  --   --  81   ALT (SGPT) U/L  --   --  35   AST (SGOT) U/L  --   --  40   GLUCOSE mg/dL 98   < > 136*    < > = values in this interval not displayed.     Results from last 7 days   Lab Units 02/22/24  0530   SODIUM mmol/L 139   POTASSIUM mmol/L 4.0   CHLORIDE mmol/L 105   CO2 mmol/L 22.0   BUN mg/dL 12   CREATININE mg/dL 0.76   GLUCOSE mg/dL 98   CALCIUM mg/dL 8.7     Results from last 7 days   Lab Units 02/19/24  1603   INR  1.04     Lab Results   Lab Value Date/Time    TROPONINT 1,543 (C) 02/20/2024 0615    TROPONINT 1,511 (C) 02/20/2024 0029    TROPONINT 1,331 (C) 02/19/2024 1844    TROPONINT 1,220 (C) 02/19/2024 1603     Results from last 7 days   Lab Units 02/20/24  0615   TSH uIU/mL 1.910     Results from last 7 days   Lab Units 02/21/24  0153   CHOLESTEROL mg/dL 120   TRIGLYCERIDES mg/dL 85   HDL CHOL mg/dL 28*   LDL CHOL mg/dL 75     Results from last 7 days   Lab Units 02/19/24  1603   PROBNP pg/mL 788.3     Results from last 7 days   Lab Units 02/20/24  0615 02/20/24  0029 02/19/24  1844   HSTROP T ng/L 1,543* 1,511* 1,331*     LHC:    Occluded proximal RCA.  Large thrombus burden treated with PTCA, and penumbra CAT Rx aspiration/mechanical thrombectomy.  Residual 50% large thrombotic lesion but PRINCESS grade III flow in the distal vessel.    Essentially normal left coronary artery angiographically    LVEF 55%    Relook LHC:    Re-occluded proximal RCA    Normal left sided system with new left-to-right collaterals    High normal LVEDP of 16mmHg    Given patient is chest pain free with preserved cardiac function, further interventions were aborted.        Tele: Sinus rhythm    Assessment:      Unstable angina    Hypertension    Hyperlipidemia    Hyponatremia    Hyperglycemia     NSTEMI (non-ST elevated myocardial infarction)      NSTEMI,   currently pain-free.  Occluded RCA. Significant thrombus that did not clear with heparin, Brilinta and Integrilin.  Likely representing completed infarct.  Has collaterals.  Hypertension, noted on admission.  Per patient no previous diagnosis of this.  Dyslipidemia, stable on statin.  Recent GI illness    Plan:  Okay to discharge home  Discharge on aspirin, Brilinta, statin, and beta-blocker.  No ACE/ARB/ARNI necessary given his LVEF of 60%.  Follow-up in our office in 3 to 4 weeks time  Discharge cardiovascular meds addressed and ADT    BRITTANY Hanna   Dictated utilizing Dragon dictation  I have seen and examined the patient, I have reviewed the note, discussed the case with the advance practice clinician, made necessary changes and I agree with the final note.    Braeden Alcantara MD  02/22/24  09:57 EST

## 2024-02-22 NOTE — TELEPHONE ENCOUNTER
Reason for Disposition   Prescription prescribed recently is not at pharmacy and triager has access to patient's EMR and prescription is recorded in the EMR    Additional Information   Negative: Intentional drug overdose and suicidal thoughts or ideas   Negative: Drug overdose and triager unable to answer question   Negative: Caller requesting a renewal or refill of a medicine patient is currently taking   Negative: Caller requesting information unrelated to medicine   Negative: Caller requesting information about COVID-19 Vaccine   Negative: Caller requesting information about Emergency Contraception   Negative: Caller requesting information about Combined Birth Control Pills   Negative: Caller requesting information about Progestin Birth Control Pills   Negative: Caller requesting information about Post-Op pain or medicines   Negative: Caller requesting a prescription antibiotic (such as penicillin) for Strep throat and has a positive culture result   Negative: Caller requesting a prescription anti-viral med (such as Tamiflu) and has influenza (flu) symptoms   Negative: Immunization reaction suspected   Negative: Rash while taking a medicine or within 3 days of stopping it   Negative: Asthma and having symptoms of asthma (cough, wheezing, etc.)   Negative: Symptom of illness (e.g., headache, abdominal pain, earache, vomiting) that are more than mild   Negative: Breastfeeding questions about mother's medicines and diet   Negative: MORE THAN A DOUBLE DOSE of a prescription or over-the-counter (OTC) drug   Negative: DOUBLE DOSE (an extra dose or lesser amount) of prescription drug and any symptoms (e.g., dizziness, nausea, pain, sleepiness)   Negative: DOUBLE DOSE (an extra dose or lesser amount) of over-the-counter (OTC) drug and any symptoms (e.g., dizziness, nausea, pain, sleepiness)   Negative: Took another person's prescription drug   Negative: DOUBLE DOSE (an extra dose or lesser amount) of prescription drug  and NO symptoms  (Exception: A double dose of antibiotics.)   Negative: Diabetes drug error or overdose (e.g., took wrong type of insulin or took extra dose)   Negative: Caller has medication question about med NOT prescribed by PCP and triager unable to answer question (e.g., compatibility with other med, storage)   Negative: Prescription not at pharmacy and was prescribed by PCP recently  (Exception: triager has access to EMR and prescription is recorded there. Go to Home Care and confirm for pharmacy.)   Negative: Pharmacy calling with prescription question and triager unable to answer question   Negative: Caller has URGENT medicine question about med that PCP or specialist prescribed and triager unable to answer question   Negative: Caller has NON-URGENT medicine question about med that PCP or specialist prescribed and triager unable to answer question   Negative: Caller wants to use a complementary or alternative medicine   Negative: Medicine patch causing local rash or itching   Negative: Prescription request for new medicine (not a refill)    Protocols used: Medication Question Call-ADULT-[place]

## 2024-02-22 NOTE — PLAN OF CARE
Problem: Adult Inpatient Plan of Care  Goal: Absence of Hospital-Acquired Illness or Injury  Intervention: Identify and Manage Fall Risk  Description: Perform standard risk assessment on admission using a validated tool or comprehensive approach appropriate to the patient; reassess fall risk frequently, with change in status or transfer to another level of care.  Communicate fall injury risk to interprofessional healthcare team.  Determine need for increased observation, equipment and environmental modification, such as low bed, signage and supportive, nonskid footwear.  Adjust safety measures to individual developmental age, stage and identified risk factors.  Reinforce the importance of safety and physical activity with patient and family.  Perform regular intentional rounding to assess need for position change, pain assessment and personal needs, including assistance with toileting.  Recent Flowsheet Documentation  Taken 2/22/2024 0440 by Landy Gifford RN  Safety Promotion/Fall Prevention:   activity supervised   assistive device/personal items within reach   clutter free environment maintained   fall prevention program maintained   nonskid shoes/slippers when out of bed   room organization consistent   safety round/check completed   toileting scheduled  Taken 2/22/2024 0230 by Landy Gifford RN  Safety Promotion/Fall Prevention:   activity supervised   assistive device/personal items within reach   clutter free environment maintained   fall prevention program maintained   nonskid shoes/slippers when out of bed   room organization consistent   safety round/check completed   toileting scheduled  Taken 2/22/2024 0000 by Landy Gifford RN  Safety Promotion/Fall Prevention:   activity supervised   assistive device/personal items within reach   clutter free environment maintained   fall prevention program maintained   nonskid shoes/slippers when out of bed   room organization consistent   safety round/check  completed   toileting scheduled  Taken 2/21/2024 2200 by Landy Gifford RN  Safety Promotion/Fall Prevention:   activity supervised   assistive device/personal items within reach   clutter free environment maintained   fall prevention program maintained   nonskid shoes/slippers when out of bed   room organization consistent   safety round/check completed   toileting scheduled  Taken 2/21/2024 2000 by Landy Gifford RN  Safety Promotion/Fall Prevention:   activity supervised   assistive device/personal items within reach   clutter free environment maintained   fall prevention program maintained   nonskid shoes/slippers when out of bed   room organization consistent   safety round/check completed   toileting scheduled     Problem: Adult Inpatient Plan of Care  Goal: Absence of Hospital-Acquired Illness or Injury  Intervention: Prevent Skin Injury  Description: Perform a screening for skin injury risk, such as pressure or moisture associated skin damage on admission and at regular intervals throughout hospital stay.  Keep all areas of skin (especially folds) clean and dry.  Maintain adequate skin hydration.  Relieve and redistribute pressure and protect bony prominences; implement measures based on patient-specific risk factors.  Match turning and repositioning schedule to clinical condition.  Encourage weight shift frequently; assist with reposition if unable to complete independently.  Float heels off bed; avoid pressure on the Achilles tendon.  Keep skin free from extended contact with medical devices.  Encourage functional activity and mobility, as early as tolerated.  Use aids (e.g., slide boards, mechanical lift) during transfer.  Recent Flowsheet Documentation  Taken 2/22/2024 0440 by Landy Gifford RN  Body Position: position changed independently  Skin Protection:   adhesive use limited   skin sealant/moisture barrier applied   skin-to-device areas padded   skin-to-skin areas padded   transparent dressing  maintained   tubing/devices free from skin contact  Taken 2/22/2024 0230 by Landy Gifford RN  Body Position: position changed independently  Skin Protection:   adhesive use limited   skin sealant/moisture barrier applied   skin-to-device areas padded   skin-to-skin areas padded   transparent dressing maintained   tubing/devices free from skin contact  Taken 2/22/2024 0000 by Landy Gifford RN  Body Position: position changed independently  Skin Protection:   adhesive use limited   skin sealant/moisture barrier applied   skin-to-device areas padded   skin-to-skin areas padded   transparent dressing maintained   tubing/devices free from skin contact  Taken 2/21/2024 2200 by Landy Gifford RN  Body Position: position changed independently  Skin Protection:   adhesive use limited   skin sealant/moisture barrier applied   skin-to-device areas padded   skin-to-skin areas padded   transparent dressing maintained   tubing/devices free from skin contact  Taken 2/21/2024 2000 by Landy Gifford RN  Body Position: position changed independently  Skin Protection:   adhesive use limited   skin sealant/moisture barrier applied   skin-to-device areas padded   skin-to-skin areas padded   transparent dressing maintained   tubing/devices free from skin contact     Problem: Adult Inpatient Plan of Care  Goal: Absence of Hospital-Acquired Illness or Injury  Intervention: Prevent and Manage VTE (Venous Thromboembolism) Risk  Description: Assess for VTE (venous thromboembolism) risk.  Encourage and assist with early ambulation.  Initiate and maintain compression or other therapy, as indicated, based on identified risk in accordance with organizational protocol and provider order.  Encourage both active and passive leg exercises while in bed, if unable to ambulate.  Recent Flowsheet Documentation  Taken 2/22/2024 0440 by Landy Gifford RN  Activity Management: activity encouraged  Taken 2/22/2024 0230 by Landy Gifford RN  Activity  Management: activity encouraged  Taken 2/22/2024 0000 by Landy Gifford RN  Activity Management: activity encouraged  Taken 2/21/2024 2200 by Landy Gifford RN  Activity Management: activity encouraged  Taken 2/21/2024 2000 by Landy Gifford RN  Activity Management: activity encouraged  VTE Prevention/Management: (see MAR) other (see comments)  Range of Motion: active ROM (range of motion) encouraged     Problem: Hypertension Comorbidity  Goal: Blood Pressure in Desired Range  Intervention: Maintain Blood Pressure Management  Description: Evaluate adherence to home antihypertensive regimen (e.g., exercise and activity, diet modification, medication).  Provide scheduled antihypertensive medication; consider administration time and effects (e.g., avoid giving diuretic prior to bedtime).  Monitor response to antihypertensive medication therapy (e.g., blood pressure, electrolyte levels, medication effects).  Minimize risk of orthostatic hypotension; encourage caution with position changes, particularly if elderly.  Recent Flowsheet Documentation  Taken 2/22/2024 0440 by Landy Gifford RN  Medication Review/Management: medications reviewed  Taken 2/22/2024 0230 by Landy Gifford RN  Medication Review/Management: medications reviewed  Taken 2/22/2024 0000 by Landy Gifford RN  Medication Review/Management: medications reviewed  Taken 2/21/2024 2200 by Landy Giffodr RN  Medication Review/Management: medications reviewed  Taken 2/21/2024 2000 by Landy Gifford RN  Syncope Management: position changed slowly  Medication Review/Management: medications reviewed     Problem: Fall Injury Risk  Goal: Absence of Fall and Fall-Related Injury  Outcome: Ongoing, Progressing  Intervention: Identify and Manage Contributors  Description: Develop a fall prevention plan with the patient and caregiver/family.  Provide reorientation, appropriate sensory stimulation and routines with changes in mental status to decrease risk of  fall.  Promote use of personal vision and auditory aids.  Assess assistance level required for safe and effective self-care; provide support as needed, such as toileting, mobilization. For age 65 and older, implement timed toileting with assistance.  Encourage physical activity, such as performance of mobility and self-care at highest level of patient ability, multicomponent exercise program and provision of appropriate assistive devices.  If fall occurs, assess the severity of injury; implement fall injury protocol. Determine the cause and revise fall injury prevention plan.  Regularly review medication contribution to fall risk; adjust medication administration times to minimize risk of falling.  Consider risk related to polypharmacy and age.  Balance adequate pain management with potential for oversedation.  Recent Flowsheet Documentation  Taken 2/22/2024 0440 by Landy Gifford RN  Medication Review/Management: medications reviewed  Self-Care Promotion: independence encouraged  Taken 2/22/2024 0230 by Landy Gifford RN  Medication Review/Management: medications reviewed  Self-Care Promotion: independence encouraged  Taken 2/22/2024 0000 by Landy Gifford RN  Medication Review/Management: medications reviewed  Self-Care Promotion: independence encouraged  Taken 2/21/2024 2200 by Landy Gifford RN  Medication Review/Management: medications reviewed  Self-Care Promotion: independence encouraged  Taken 2/21/2024 2000 by Landy Gifford RN  Medication Review/Management: medications reviewed  Self-Care Promotion: independence encouraged  Intervention: Promote Injury-Free Environment  Description: Provide a safe, barrier-free environment that encourages independent activity.  Keep care area uncluttered and well-lighted.  Determine need for increased observation or monitoring.  Avoid use of devices that minimize mobility, such as restraints or indwelling urinary catheter.  Recent Flowsheet Documentation  Taken  2/22/2024 0440 by Landy Gifford RN  Safety Promotion/Fall Prevention:   activity supervised   assistive device/personal items within reach   clutter free environment maintained   fall prevention program maintained   nonskid shoes/slippers when out of bed   room organization consistent   safety round/check completed   toileting scheduled  Taken 2/22/2024 0230 by Landy Gifford RN  Safety Promotion/Fall Prevention:   activity supervised   assistive device/personal items within reach   clutter free environment maintained   fall prevention program maintained   nonskid shoes/slippers when out of bed   room organization consistent   safety round/check completed   toileting scheduled  Taken 2/22/2024 0000 by Landy Gifford RN  Safety Promotion/Fall Prevention:   activity supervised   assistive device/personal items within reach   clutter free environment maintained   fall prevention program maintained   nonskid shoes/slippers when out of bed   room organization consistent   safety round/check completed   toileting scheduled  Taken 2/21/2024 2200 by Landy Gifford RN  Safety Promotion/Fall Prevention:   activity supervised   assistive device/personal items within reach   clutter free environment maintained   fall prevention program maintained   nonskid shoes/slippers when out of bed   room organization consistent   safety round/check completed   toileting scheduled  Taken 2/21/2024 2000 by Landy Gifford RN  Safety Promotion/Fall Prevention:   activity supervised   assistive device/personal items within reach   clutter free environment maintained   fall prevention program maintained   nonskid shoes/slippers when out of bed   room organization consistent   safety round/check completed   toileting scheduled     Problem: Fall Injury Risk  Goal: Absence of Fall and Fall-Related Injury  Intervention: Promote Injury-Free Environment  Description: Provide a safe, barrier-free environment that encourages independent  activity.  Keep care area uncluttered and well-lighted.  Determine need for increased observation or monitoring.  Avoid use of devices that minimize mobility, such as restraints or indwelling urinary catheter.  Recent Flowsheet Documentation  Taken 2/22/2024 0440 by Landy Gifford RN  Safety Promotion/Fall Prevention:   activity supervised   assistive device/personal items within reach   clutter free environment maintained   fall prevention program maintained   nonskid shoes/slippers when out of bed   room organization consistent   safety round/check completed   toileting scheduled  Taken 2/22/2024 0230 by Landy Gifford RN  Safety Promotion/Fall Prevention:   activity supervised   assistive device/personal items within reach   clutter free environment maintained   fall prevention program maintained   nonskid shoes/slippers when out of bed   room organization consistent   safety round/check completed   toileting scheduled  Taken 2/22/2024 0000 by Landy Gifford RN  Safety Promotion/Fall Prevention:   activity supervised   assistive device/personal items within reach   clutter free environment maintained   fall prevention program maintained   nonskid shoes/slippers when out of bed   room organization consistent   safety round/check completed   toileting scheduled  Taken 2/21/2024 2200 by Landy Gifford RN  Safety Promotion/Fall Prevention:   activity supervised   assistive device/personal items within reach   clutter free environment maintained   fall prevention program maintained   nonskid shoes/slippers when out of bed   room organization consistent   safety round/check completed   toileting scheduled  Taken 2/21/2024 2000 by Landy Gifford RN  Safety Promotion/Fall Prevention:   activity supervised   assistive device/personal items within reach   clutter free environment maintained   fall prevention program maintained   nonskid shoes/slippers when out of bed   room organization consistent   safety round/check  completed   toileting scheduled     Problem: Ongoing Anesthesia/Sedation Effects (Cardiac Catheterization)  Goal: Anesthesia/Sedation Recovery  Outcome: Ongoing, Progressing  Intervention: Optimize Anesthesia Recovery  Description: Maintain patent airway; position to minimize risk of obstruction and aspiration.  Monitor respiratory status for signs of hypoventilation; provide oxygen therapy judiciously if hypoxemia present.  Monitor level of consciousness and response to verbal and physical stimulation; protect areas of decreased sensation (e.g., anatomical positioning, heat and cold avoidance, medical device or object positioning).  Individualize frequency and intensity of monitoring based on sedation or anesthesia administered, identified risk factors, ongoing assessment and organizational protocol.  Optimize fluid balance; anticipate need for fluid resuscitation.  Prepare for administration of pharmacologic therapy, such as reversal agent, antiemetic or antipruritic medication, to manage sedation or anesthesia effects.  Assess neurocognitive function and risks that may lead to postoperative delirium, such as decreased level of consciousness, pain and agitation; offer reassurance; answer questions.  Adjust environment to maintain safety (e.g., fall precautions, safety equipment).  Recent Flowsheet Documentation  Taken 2/22/2024 0440 by Landy Gifford RN  Safety Promotion/Fall Prevention:   activity supervised   assistive device/personal items within reach   clutter free environment maintained   fall prevention program maintained   nonskid shoes/slippers when out of bed   room organization consistent   safety round/check completed   toileting scheduled  Taken 2/22/2024 0230 by Landy Gifford RN  Safety Promotion/Fall Prevention:   activity supervised   assistive device/personal items within reach   clutter free environment maintained   fall prevention program maintained   nonskid shoes/slippers when out of bed    room organization consistent   safety round/check completed   toileting scheduled  Taken 2/22/2024 0000 by Landy Gifford RN  Safety Promotion/Fall Prevention:   activity supervised   assistive device/personal items within reach   clutter free environment maintained   fall prevention program maintained   nonskid shoes/slippers when out of bed   room organization consistent   safety round/check completed   toileting scheduled  Taken 2/21/2024 2200 by Landy Gifford RN  Safety Promotion/Fall Prevention:   activity supervised   assistive device/personal items within reach   clutter free environment maintained   fall prevention program maintained   nonskid shoes/slippers when out of bed   room organization consistent   safety round/check completed   toileting scheduled  Taken 2/21/2024 2000 by Landy Gifford RN  Safety Promotion/Fall Prevention:   activity supervised   assistive device/personal items within reach   clutter free environment maintained   fall prevention program maintained   nonskid shoes/slippers when out of bed   room organization consistent   safety round/check completed   toileting scheduled  Administration (IS): self-administered   Goal Outcome Evaluation:  Plan of Care Reviewed With: patient        Progress: improving  Outcome Evaluation: Pt AO x4/VSS/95% on RA/ NSR. Radial site soft, some old drainage, no issues. Pt denies SOA/pain/N/V. Continue monitoring.

## 2024-02-23 LAB
QT INTERVAL: 346 MS
QTC INTERVAL: 416 MS

## 2024-02-23 NOTE — OUTREACH NOTE
Prep Survey      Flowsheet Row Responses   [facility] facility patient discharged from? [facility]   Is LACE score < 7 ? No   Eligibility Readm Mgmt   Discharge diagnosis Unstable angina, NSTEMI, heart cath & PTCA   Does the patient have one of the following disease processes/diagnoses(primary or secondary)? Acute MI (STEMI,NSTEMI)   Does the patient have Home health ordered? No   Is there a DME ordered? No   Prep survey completed? Yes            Kristin Anguiano Registered Nurse

## 2024-02-23 NOTE — PAYOR COMM NOTE
"[facility]# [id number]   Discharge Summary    Utilization Review  Phone [phone]  Fax [phone]    [place] [address]     Taqueria Madden (63 y.o. Male)       Date of Birth   1960    Social Security Number       Address   [address] [place] [address]    Home Phone   [phone]    MRN   [id number]       Alevism   Non-Pentecostalism    Marital Status                               Admission Date   24    Admission Type   Emergency    Admitting Provider   Chanel Ignacio DO    Attending Provider       Department, Room/Bed   [facility] 6A, N621/1       Discharge Date   2024    Discharge Disposition   Home or Self Care    Discharge Destination                                 Attending Provider: (none)   Allergies: No Known Allergies    Isolation: None   Infection: None   Code Status: Prior    Ht: 177.8 cm (70\")   Wt: 95.3 kg (210 lb)    Admission Cmt: None   Principal Problem: Unstable angina [I20.0]                   Active Insurance as of 2024       Primary Coverage       Payor Plan Insurance Group Employer/Plan Group    Pending sale to [id number]       Payor Plan Address Payor Plan Phone Number Payor Plan Fax Number Effective Dates    [phone]   2014 - None Entered    [place] [address]         Subscriber Name Subscriber Birth Date Member ID       DARIN MADDEN 1960 [id number]                     Emergency Contacts        (Rel.) Home Phone Work Phone Mobile Phone    DARIN MADDEN (Spouse) -- -- [phone]                 Discharge Summary        Chanel Ignacio DO at [address] [place] [address] Medicine Services  DISCHARGE SUMMARY    Patient Name: Taqueria Madden  : 1960  MRN: [id number]    Date of Admission: 2024  4:57 PM  Date of Discharge:  2024  Primary Care Physician: Liya Rubio, " APRN    Consults       Date and Time Order Name Status Description    2/19/2024  8:21 PM Inpatient Cardiology Consult Completed             Hospital Course     Presenting Problem: NSTEMI    Active Hospital Problems    Diagnosis  POA    **Unstable angina [I20.0]  Yes    Hypertension [I10]  Yes    Hyperlipidemia [E78.5]  Yes    Hyponatremia [E87.1]  Yes    Hyperglycemia [R73.9]  Yes    NSTEMI (non-ST elevated myocardial infarction) [I21.4]  Yes      Resolved Hospital Problems   No resolved problems to display.          Hospital Course:  Taqueria Madden is a 63 y.o. male with a history of hypertension, hyperlipidemia, presents to the ED with complaints of chest pain and markedly ABNORMAL EKG and troponin     NSTEMI  -- Chest x-ray shows no acute cardiopulmonary disease. CT chest neg.   -- Cardiology follows, [facility] 2/20, showed occluded proximal RCA, large thrombus burden treated with PTCA.  Had residual 50% large thrombotic lesion.    -s/p Relook angiography on 2/21 with again visualized reoccluded proximal RCA but given that patient was asymptomatic and chest pain-free, further intervention was aborted  -- cont ASA, ticagrelor BID for at least 12 months, statin, bisoprolol  --needs cardiac rehab     Hypertension  Hyperlipidemia  -- Continue statin  -- Continue bisoprolol per cardiology     Mild hyponatremia, not significant  -- Resolved after IVF.      Hyperglycemia  Obesity      Discharge Follow Up Recommendations for outpatient labs/diagnostics:  PCP in 1 week  Dr. Alcantara in 3 weeks    Day of Discharge     HPI:   Doing okay, no chest pain.  Wife at bedside    Review of Systems  Gen- No fevers, chills  CV- No chest pain, palpitations  Resp- No cough, dyspnea  GI- No N/V/D, abd pain      Vital Signs:   Temp:  [97.9 °F (36.6 °C)-98.6 °F (37 °C)] 98.3 °F (36.8 °C)  Heart Rate:  [65-88] 65  Resp:  [14-18] 18  BP: ()/(64-87) 119/81  Flow (L/min):  [2] 2      Physical Exam:  Constitutional: No acute distress, awake,  alert  HENT: NCAT, mucous membranes moist  Respiratory: Respiratory effort normal   Cardiovascular: RRR, no murmurs, rubs, or gallops  Musculoskeletal: No bilateral ankle edema  Psychiatric: Appropriate affect, cooperative  Neurologic: Oriented x 3, speech clear  Skin: No rashes      Pertinent  and/or Most Recent Results     LAB RESULTS:      Lab 02/21/24  1220 02/21/24  0153 02/20/24  1954 02/20/24  0821 02/20/24  0615 02/20/24  0029 02/19/24  1603   WBC  --  9.02  --   --  7.88  --  8.28   HEMOGLOBIN  --  15.3  --   --  15.6  --  18.5*   HEMATOCRIT  --  44.0  --   --  46.8  --  52.6*   PLATELETS  --  231  --   --  240  --  269   NEUTROS ABS  --   --   --   --  4.74  --  5.36   IMMATURE GRANS (ABS)  --   --   --   --  0.04  --  0.04   LYMPHS ABS  --   --   --   --  2.25  --  1.98   MONOS ABS  --   --   --   --  0.74  --  0.75   EOS ABS  --   --   --   --  0.07  --  0.11   MCV  --  86.8  --   --  86.7  --  86.7   PROCALCITONIN  --   --   --   --   --   --  0.11   LACTATE  --   --   --   --   --   --  1.5   PROTIME  --   --   --   --   --   --  13.7   APTT  --   --   --   --   --   --  30.7*   HEPARIN ANTI-XA 0.51 0.29* 0.10* 0.11*  --  0.10* 0.10*   D DIMER QUANT  --   --   --   --   --   --  <0.27         Lab 02/22/24  0530 02/21/24  0153 02/20/24  0615 02/19/24  1603   SODIUM 139 135* 138 134*   POTASSIUM 4.0 4.0 4.0 3.5   CHLORIDE 105 103 105 99   CO2 22.0 23.0 21.0* 23.0   ANION GAP 12.0 9.0 12.0 12.0   BUN 12 12 13 13   CREATININE 0.76 0.81 0.83 0.87   EGFR 101.0 99.1 98.3 97.0   GLUCOSE 98 104* 117* 136*   CALCIUM 8.7 8.4* 8.5* 9.7   MAGNESIUM  --   --  2.2  --    HEMOGLOBIN A1C  --   --  5.60  --    TSH  --   --  1.910  --          Lab 02/19/24  1603   TOTAL PROTEIN 7.9   ALBUMIN 4.5   GLOBULIN 3.4   ALT (SGPT) 35   AST (SGOT) 40   BILIRUBIN 1.7*   ALK PHOS 81   LIPASE 20         Lab 02/20/24  0615 02/20/24  0029 02/19/24  1844 02/19/24  1603   PROBNP  --   --   --  788.3   HSTROP T 1,543* 1,511* 1,331*  1,220*   PROTIME  --   --   --  13.7   INR  --   --   --  1.04         Lab 02/21/24  0153 02/20/24  0615   CHOLESTEROL 120 137   LDL CHOL 75 88   HDL CHOL 28* 30*   TRIGLYCERIDES 85 100             Brief Urine Lab Results       None          Microbiology Results (last 10 days)       ** No results found for the last 240 hours. **            Cardiac Catheterization/Vascular Study    Result Date: 2/21/2024    Re-occluded proximal RCA   Normal left sided system with new left-to-right collaterals   High normal LVEDP of 16mmHg   Given patient is chest pain free with preserved cardiac function, further interventions were aborted.     Adult Transthoracic Echo Complete W/ Cont if Necessary Per Protocol    Result Date: 2/20/2024    Left ventricular systolic function is normal. Estimated left ventricular EF = 60%   The cardiac valves are anatomically and functionarmal.     Cardiac Catheterization/Vascular Study    Result Date: 2/20/2024    Occluded proximal RCA.  Large thrombus burden treated with PTCA, and penumbra CAT Rx aspiration/mechanical thrombectomy.  Residual 50% large thrombotic lesion but PRINCESS grade III flow in the distal vessel.   Essentially normal left coronary artery angiographically   LVEF 55%     CT Chest Without Contrast Diagnostic    Result Date: 2/19/2024  CT CHEST WO CONTRAST DIAGNOSTIC Date of Exam: 2/19/2024 7:47 PM EST Indication: Chest pain, nonspecific pain after vomiting. Comparison: None available. Technique: Axial CT images were obtained of the chest without contrast administration.  Reconstructed coronal and sagittal images were also obtained. Automated exposure control and iterative construction methods were used. Findings: The thyroid gland is normal. The subglottic airway is clear. Calcified granuloma within the right upper lobe. Right hilar calcified lymph nodes. The left lung is clear. No consolidation. No pneumothorax or pleural effusion. Adenopathy. Mild atheromatous disease of the coronary  vessels. Partially visualized likely left renal cyst. Calcified granulomata within the spleen. Otherwise the visualized upper abdomen is normal.     No rib fractures. No acute cardiopulmonary disease is noted. Electronically Signed: Conor Mitchell MD  2/19/2024 8:49 PM EST  Workstation ID: [id number]    XR Chest 1 View    Result Date: 2/19/2024  XR CHEST 1 VW Date of Exam: 2/19/2024 5:06 PM EST Indication: Chest Pain Triage Protocol Comparison: None available. Findings: No focal consolidation. No pneumothorax or pleural effusion. Cardiac size is normal. The visualized clavicles appear intact. No displaced rib fractures. The visualized upper abdomen is normal.     Impression: No acute cardiopulmonary disease. Electronically Signed: Conor Mitchell MD  2/19/2024 5:43 PM EST  Workstation ID: [id number]             Results for orders placed during the hospital encounter of 02/19/24    Adult Transthoracic Echo Complete W/ Cont if Necessary Per Protocol    Interpretation Summary    Left ventricular systolic function is normal. Estimated left ventricular EF = 60%    The cardiac valves are anatomically and functionarmal.      Plan for Follow-up of Pending Labs/Results:     Discharge Details        Discharge Medications        New Medications        Instructions Start Date   aspirin 81 MG EC tablet   81 mg, Oral, Daily   Start Date: February 23, 2024     bisoprolol 5 MG tablet  Commonly known as: ZEBeta   2.5 mg, Oral, Every 24 Hours Scheduled   Start Date: February 23, 2024     ticagrelor 90 MG tablet tablet  Commonly known as: BRILINTA   90 mg, Oral, Every 12 Hours Scheduled             Changes to Medications        Instructions Start Date   atorvastatin 40 MG tablet  Commonly known as: LIPITOR  What changed:   medication strength  how much to take  when to take this   40 mg, Oral, Nightly             Continue These Medications        Instructions Start Date   Melatonin 10 MG tablet   1 tablet, Oral, Nightly               No Known  Allergies      Discharge Disposition:  Home or Self Care    Diet:  Hospital:  Diet Order   Procedures    Diet: Cardiac Diets, Diabetic Diets; Healthy Heart (2-3 Na+); Consistent Carbohydrate; Texture: Regular Texture (IDDSI 7); Fluid Consistency: Thin (IDDSI 0)            Activity:      Restrictions or Other Recommendations:  Continue Brilinta       CODE STATUS:    Code Status and Medical Interventions:   Ordered at: 02/19/24 2007     Level Of Support Discussed With:    Patient     Code Status (Patient has no pulse and is not breathing):    CPR (Attempt to Resuscitate)     Medical Interventions (Patient has pulse or is breathing):    Full Support       Future Appointments   Date Time Provider Department Center   3/7/2024  1:00 PM OR[facility]ATION [facility]  [facility]   4/29/2024  2:30 PM Braeden Alcantara MD [facility]       Additional Instructions for the Follow-ups that You Need to Schedule       Discharge Follow-up with PCP   As directed       Currently Documented PCP:    Liya Rubio APRN    PCP Phone Number:    [phone]     Follow Up Details: 1 week        Discharge Follow-up with Specialty: Maricruz/karolyn; 3 Weeks   As directed      Specialty: Maricruz/karolyn   Follow Up: 3 Weeks                      Chanel Stout DO  02/22/24      Time Spent on Discharge:  I spent  36  minutes on this discharge activity which included: face-to-face encounter with the patient, reviewing the data in the system, coordination of the care with the nursing staff as well as consultants, documentation, and entering orders.            Electronically signed by Chanel Stout DO at 02/22/24 1413       Discharge Order (From admission, onward)       Start     Ordered    02/22/24 1145  Discharge patient  Once        Expected Discharge Date: 02/22/24   Discharge Disposition: Home or Self Care   Physician of Record for Attribution - Please select from Treatment Team: CHANEL STOUT [[id number]]    Review needed by CMO to determine Physician of Record: No      Question Answer Comment   Physician of Record for Attribution - Please select from Treatment Team MATI STOUT    Review needed by CMO to determine Physician of Record No        02/22/24 1143

## 2024-02-26 ENCOUNTER — READMISSION MANAGEMENT (OUTPATIENT)
Dept: CALL CENTER | Facility: HOSPITAL | Age: 64
End: 2024-02-26
Payer: COMMERCIAL

## 2024-02-26 NOTE — OUTREACH NOTE
AMI Week 1 Survey      Flowsheet Row Responses   [facility] patient discharged from? [place]   Does the patient have one of the following disease processes/diagnoses(primary or secondary)? Acute MI (STEMI,NSTEMI)   Week 1 attempt successful? Yes   Call start time 1400   Call end time 1406   Discharge diagnosis Unstable angina, NSTEMI, heart cath & PTCA   Meds reviewed with patient/caregiver? Yes   Is the patient having any side effects they believe may be caused by any medication additions or changes? No   Does the patient have all prescriptions related to this admission filled (includes statins,anticoagulants,HTN meds,anti-arrhythmia meds) Yes   Is the patient taking all medications as directed (includes completed medication regime)? Yes   Does the patient have a primary care provider?  Yes   Does the patient have an appointment with their PCP,cardiologist,or clinic within 7 days of discharge? Yes   Has the patient kept scheduled appointments due by today? N/A   Comments Pt c/o SOA, CP w/exertion resolves with rest. R. radial site, bruised at site, pt denies pain or other issues.   Did the patient receive a copy of their discharge instructions? Yes   Nursing interventions Reviewed instructions with patient   What is the patient's perception of their health status since discharge? Improving   Nursing interventions Nurse provided patient education   Is the patient/caregiver able to teach back signs and symptoms of when to call for help immediately: Sudden chest discomfort, Sudden discomfort in arms, back, neck or jaw, Shortness of breath at any time, Irregular or rapid heart rate, Dizziness or lightheadedness   Nursing interventions Nurse provided patient education   Is the pateint /caregiver able to teach back the importance of cardiac rehab? Yes   Is the patient/caregiver able to teach back lifestyle changes to help prevent MIs Heart healthy diet   Is the patient/caregiver able to teach back ways to prevent  a second heart attack: Take medications, Follow up with MD, Participate in Cardiac Rehab   If the patient is a current smoker, are they able to teach back resources for cessation? Not a smoker   Is the patient/caregiver able to teach back the hierarchy of who to call/visit for symptoms/problems? PCP, Specialist, Home health nurse, Urgent Care, ED, 911 Yes   Week 1 call completed? Yes   Revoked No further contact(revokes)-requires comment   Call end time 1406            Anisha BENAVIDES - Registered Nurse

## 2024-03-03 LAB
QT INTERVAL: 310 MS
QT INTERVAL: 332 MS
QTC INTERVAL: 407 MS
QTC INTERVAL: 410 MS

## 2024-03-06 ENCOUNTER — TELEPHONE (OUTPATIENT)
Dept: CARDIAC REHAB | Facility: HOSPITAL | Age: 64
End: 2024-03-06
Payer: COMMERCIAL

## 2024-03-06 NOTE — TELEPHONE ENCOUNTER
Patient is called and insurance coverage for Phase II cardiac rehab is reviewed with patient. He is told he could have charges of up to 39.00/session until his maximum out of pocket of 6000.00 is met. He has met 1273.86 of his maximum out of pocket, as of today 3/07/24.  Patient states an understanding and confirms his appointment 3/07/24 at 1300.

## 2024-03-07 ENCOUNTER — TREATMENT (OUTPATIENT)
Dept: CARDIAC REHAB | Facility: HOSPITAL | Age: 64
End: 2024-03-07
Payer: COMMERCIAL

## 2024-03-07 DIAGNOSIS — I21.4 NSTEMI, INITIAL EPISODE OF CARE: ICD-10-CM

## 2024-03-07 PROCEDURE — 93798 PHYS/QHP OP CAR RHAB W/ECG: CPT

## 2024-03-07 NOTE — PROGRESS NOTES
Patient attended Phase II Cardiac Rehab orientation. Heart and lungs assessed per RN. Health History and medications reconciled. Patient plans to return on Thursday, March 14th for Intro to Exercise Class.

## 2024-03-14 ENCOUNTER — TREATMENT (OUTPATIENT)
Dept: CARDIAC REHAB | Facility: HOSPITAL | Age: 64
End: 2024-03-14
Payer: COMMERCIAL

## 2024-03-14 DIAGNOSIS — I21.4 NSTEMI, INITIAL EPISODE OF CARE: Primary | ICD-10-CM

## 2024-03-14 PROCEDURE — 93797 PHYS/QHP OP CAR RHAB WO ECG: CPT

## 2024-03-14 PROCEDURE — 93798 PHYS/QHP OP CAR RHAB W/ECG: CPT

## 2024-03-14 NOTE — PROGRESS NOTES
Attended Phase II Intro to Exercise class and Cardiac Rehab session. No medication or health history changes reported. See [facility] for details.

## 2024-03-15 ENCOUNTER — TREATMENT (OUTPATIENT)
Dept: CARDIAC REHAB | Facility: HOSPITAL | Age: 64
End: 2024-03-15
Payer: COMMERCIAL

## 2024-03-15 DIAGNOSIS — I21.4 NSTEMI, INITIAL EPISODE OF CARE: Primary | ICD-10-CM

## 2024-03-15 PROCEDURE — 93798 PHYS/QHP OP CAR RHAB W/ECG: CPT

## 2024-03-18 ENCOUNTER — TREATMENT (OUTPATIENT)
Dept: CARDIAC REHAB | Facility: HOSPITAL | Age: 64
End: 2024-03-18
Payer: COMMERCIAL

## 2024-03-18 DIAGNOSIS — I21.4 NSTEMI, INITIAL EPISODE OF CARE: Primary | ICD-10-CM

## 2024-03-18 PROCEDURE — 93798 PHYS/QHP OP CAR RHAB W/ECG: CPT

## 2024-03-20 ENCOUNTER — TREATMENT (OUTPATIENT)
Dept: CARDIAC REHAB | Facility: HOSPITAL | Age: 64
End: 2024-03-20
Payer: COMMERCIAL

## 2024-03-20 DIAGNOSIS — I21.4 NSTEMI, INITIAL EPISODE OF CARE: Primary | ICD-10-CM

## 2024-03-20 PROCEDURE — 93797 PHYS/QHP OP CAR RHAB WO ECG: CPT

## 2024-03-20 PROCEDURE — 93798 PHYS/QHP OP CAR RHAB W/ECG: CPT

## 2024-03-20 NOTE — PROGRESS NOTES
"  NUTRITION ASSESSMENT & EDUCATION  CARDIAC/PULMONARY REHAB      Appointment Date and Time: 03/20/24, 08:43 EDT  Patient Name: Taqueria Madden   Age: 63 y.o.     Sex:  male      Employment/Activity Level:   [employer] education level: college  Occupation:  IT work, remote                         Job Activity Level: mild  Routine Exercise: mild                                    Weight Assessment:   Height:   Ht Readings from Last 1 Encounters:   02/19/24 177.8 cm (70\")     Weight:   Wt Readings from Last 1 Encounters:   02/19/24 95.3 kg (210 lb)         BMI:    BMI Readings from Last 1 Encounters:   02/19/24 30.13 kg/m²       -------------------------------------------------------------------------------------------------  IBW: Ideal body weight: 73 kg (160 lb 15 oz)  Adjusted ideal body weight: 81.9 kg (180 lb 9 oz)  -------------------------------------------------------------------------------------------------  Weight Assessment: Obese  Weight Change last six months: relatively stable wt       Usual Weight: 210 lb       Desired Weight: <190 lb    Cardiac Risk Factors:         Atherosclerotic heart disease       Stents       Hyperlipidemia/hypercholesterolemia       Hypertension       NSTEMI, unstable angina    Pertinent Lab Values:     Total Cholesterol   Date Value Ref Range Status   02/21/2024 120 0 - 200 mg/dL Final     HDL Cholesterol   Date Value Ref Range Status   02/21/2024 28 (L) 40 - 60 mg/dL Final     LDL Cholesterol    Date Value Ref Range Status   02/21/2024 75 0 - 100 mg/dL Final     LDL/HDL Ratio   Date Value Ref Range Status   02/21/2024 2.68  Final     Triglycerides   Date Value Ref Range Status   02/21/2024 85 0 - 150 mg/dL Final     Hemoglobin A1C   Date Value Ref Range Status   02/20/2024 5.60 4.80 - 5.60 % Final     TSH   Date Value Ref Range Status   02/20/2024 1.910 0.270 - 4.200 uIU/mL Final     Glucose   Date Value Ref Range Status   02/22/2024 98 65 - 99 mg/dL Final     Labs reviewed " w/Taqueria today    Pertinent Medications:   Nutritional Supplements: reviewed  Pertinent Nutrition-Related Medications: Reviewed - no changes recommended at this time.  Self Identified Problems or Concerns:   Likes to cook. Currently he and Jyoti are babysitting 2yo grandchild at her home - so eating sometimes an issue.  Also, family outings are usually for bar food - fried.    Nutrition Influences:   Appetite: good            Taste/smell changes:  No  Factors limiting PO intake: none    Food records reviewed?: Yes, completed review of 'Rate Your Plate' score and tallies.  Extensive review and discussion of home diet today.    Taqueria lives with: wife, Jyoti Meng receives lifestyle support from others at home?: Yes  Spouse/significant other present for diet instruction today?: No    Diet Assessment:   Diet Survey Score: 41 of possible 72 = 57 % compliant with AHA guidelines    Meal intake pattern:  2-3 meals/day  Dining out: yes, 2-4 x week               Fast food:  occasionally Whopper or other, Pizza    24 hour recall: pending     Who does the grocery shopping:  both  Who does the cooking at home:  Taqueria does most of the cooking                     Home diet review:  Current diet has a moderate intake of Saturated Fats, still with room for improvement, Moderate-acceptable Sodium intake, aware of sodium guidelines and strives to reduce intake, High in eating out- restaurant and/or fast food- potential for excess portioning, sodium, saturated fats, and Expressed motivation to make heart healthy changes in diet today    Motivation level toward diet compliance:  moderate  Assessment / Recommendations:   Prior diet education before to coming to Cardiac Rehab?: No  Instructed provided on:  American Heart Association 2021 Nutrition Guidelines, Saturated Fat, Picking Healthy Proteins, Get the Scoop on Sodium/Salt <2300 mg/d, Added Sugars Guidance, Go Nuts for Heart Health, [place] 3 fats in Fish & Seafood, Dining Out  Doesn't Mean Ditch Your Diet, and Foods to Avoid on the Cardiac Diet  Written materials provided to patient: Yes    Goals/Plan:   Patient defined goals:   1) Try to follow the Mediterranean diet more closely instead of American diet  2) Decrease intake/use of de santiago, half and half, crisco/lard  3) Be more selective with breads - whole grain/fiber rich, less croissants/baguettes    Plan:  Encouraged to complete goals and continue setting new nutrition/exercise goals             Encouraged to follow up with dietitian during cardiac rehab sessions; may request additional RD counseling.    Stacy Tillman RD, 08:43 EDT - 3/20/2024

## 2024-03-22 ENCOUNTER — TREATMENT (OUTPATIENT)
Dept: CARDIAC REHAB | Facility: HOSPITAL | Age: 64
End: 2024-03-22
Payer: COMMERCIAL

## 2024-03-22 DIAGNOSIS — I21.4 NSTEMI, INITIAL EPISODE OF CARE: Primary | ICD-10-CM

## 2024-03-22 PROCEDURE — 93798 PHYS/QHP OP CAR RHAB W/ECG: CPT

## 2024-03-25 ENCOUNTER — TELEPHONE (OUTPATIENT)
Dept: CARDIAC REHAB | Facility: HOSPITAL | Age: 64
End: 2024-03-25
Payer: COMMERCIAL

## 2024-03-25 NOTE — TELEPHONE ENCOUNTER
Patient called staff stating they will not be able to make into Cardiac Rehab today due to traveling for work. Patient to return 03/27/2024.

## 2024-03-29 ENCOUNTER — TREATMENT (OUTPATIENT)
Dept: CARDIAC REHAB | Facility: HOSPITAL | Age: 64
End: 2024-03-29
Payer: COMMERCIAL

## 2024-03-29 DIAGNOSIS — I21.4 NSTEMI, INITIAL EPISODE OF CARE: Primary | ICD-10-CM

## 2024-03-29 PROCEDURE — 93798 PHYS/QHP OP CAR RHAB W/ECG: CPT

## 2024-04-01 ENCOUNTER — TREATMENT (OUTPATIENT)
Dept: CARDIAC REHAB | Facility: HOSPITAL | Age: 64
End: 2024-04-01
Payer: COMMERCIAL

## 2024-04-01 DIAGNOSIS — I21.4 NSTEMI, INITIAL EPISODE OF CARE: Primary | ICD-10-CM

## 2024-04-01 PROCEDURE — 93798 PHYS/QHP OP CAR RHAB W/ECG: CPT

## 2024-04-03 ENCOUNTER — TREATMENT (OUTPATIENT)
Dept: CARDIAC REHAB | Facility: HOSPITAL | Age: 64
End: 2024-04-03
Payer: COMMERCIAL

## 2024-04-03 DIAGNOSIS — I21.4 NSTEMI, INITIAL EPISODE OF CARE: Primary | ICD-10-CM

## 2024-04-03 PROCEDURE — 93798 PHYS/QHP OP CAR RHAB W/ECG: CPT

## 2024-04-04 NOTE — PROGRESS NOTES
Subjective:     Encounter Date:04/05/2024    Primary Care Physician: Liya Rubio APRN      Patient ID: Taqueria Madden is a 64 y.o. male.    Chief Complaint:Hypertension    Problem List:  Coronary artery disease  2/2024 NSTEMI occluded proximal RCA with large thrombus burden treated with PTCA and penumbra with residual 50% large thrombotic lesion.  Normal left coronary anatomy essentially.  EF 55%.  2/21/2024 relook angiography reoccluded proximal RCA.  Normal left-sided system with new left to right collaterals.  Further intervention aborted as patient asymptomatic.  Hypertension  Dyslipidemia  Surgeries:  Eye surgery  Vasectomy      No Known Allergies      Current Outpatient Medications:     aspirin 81 MG EC tablet, Take 1 tablet by mouth Daily., Disp: , Rfl:     atorvastatin (LIPITOR) 40 MG tablet, Take 1 tablet by mouth Every Night., Disp: 90 tablet, Rfl: 3    bisoprolol (ZEBeta) 5 MG tablet, Take 0.5 tablets by mouth Daily., Disp: 45 tablet, Rfl: 3    Cholecalciferol 25 MCG (1000 UT) capsule, Take 1 capsule by mouth Daily., Disp: , Rfl:     Melatonin 10 MG tablet, Take 1 tablet by mouth Every Night., Disp: , Rfl:     ticagrelor (BRILINTA) 90 MG tablet tablet, Take 1 tablet by mouth Every 12 (Twelve) Hours., Disp: 180 tablet, Rfl: 3    Vitamin E 670 MG (1000 UT) capsule, Take 1,000 Units by mouth Daily., Disp: , Rfl:         History of Present Illness    Patient returns today for hospital follow-up status post non-STEMI, thrombotic RCA occlusion with reocclusion that was asymptomatic.  He is currently active, doing cardiac rehab.  No exertional chest pain dyspnea.  Notes being a little short of breath but actually thinks is not different than his baseline.  Overall feels well    The following portions of the patient's history were reviewed and updated as appropriate: allergies, current medications, past family history, past medical history, past social history, past surgical history and problem  "list.      Social History     Tobacco Use    Smoking status: Former     Types: Cigarettes    Smokeless tobacco: Never   Vaping Use    Vaping status: Never Used   Substance Use Topics    Alcohol use: Yes     Comment: wine once a week    Drug use: Yes     Types: Marijuana     Comment: weekends         ROS       Objective:   /76 (BP Location: Right arm, Patient Position: Sitting)   Pulse 59   Ht 175.3 cm (69\")   Wt 101 kg (223 lb)   SpO2 95%   BMI 32.93 kg/m²         Vitals reviewed.   Constitutional:       Appearance: Well-developed and not in distress.   Neck:      Thyroid: No thyromegaly.      Vascular: No carotid bruit or JVD.   Pulmonary:      Breath sounds: Normal breath sounds.   Cardiovascular:      Regular rhythm.      No gallop. No S3 and S4 gallop.   Pulses:     Intact distal pulses.      Carotid: 2+ bilaterally.     Radial: 2+ bilaterally.  Edema:     Peripheral edema absent.   Abdominal:      General: Bowel sounds are normal.      Palpations: Abdomen is soft. There is no abdominal mass.      Tenderness: There is no abdominal tenderness.   Musculoskeletal:         General: No deformity.      Extremities: No clubbing present.Skin:     General: Skin is warm and dry.      Findings: No rash.   Neurological:      Mental Status: Alert and oriented to person, place, and time.         Procedures          Assessment:   Assessment & Plan      Diagnoses and all orders for this visit:    1. Coronary artery disease involving native coronary artery of native heart without angina pectoris (Primary)      1.  Coronary artery disease, chronically occluded RCA with collaterals.  No angina.  2.  Dyslipidemia on high intensity statin, last LDL is 75  3.  Hypertension well-controlled     Recommendations:  1.  Continue current medical therapy  2.  Continue rehab.  3.  Revisit annually apparent symptom change        Braeden Alcantara MD    Dictated utilizing Dragon dictation  "

## 2024-04-05 ENCOUNTER — OFFICE VISIT (OUTPATIENT)
Dept: CARDIOLOGY | Facility: CLINIC | Age: 64
End: 2024-04-05
Payer: COMMERCIAL

## 2024-04-05 ENCOUNTER — PATIENT ROUNDING (BHMG ONLY) (OUTPATIENT)
Dept: CARDIOLOGY | Facility: CLINIC | Age: 64
End: 2024-04-05
Payer: COMMERCIAL

## 2024-04-05 ENCOUNTER — TREATMENT (OUTPATIENT)
Dept: CARDIAC REHAB | Facility: HOSPITAL | Age: 64
End: 2024-04-05
Payer: COMMERCIAL

## 2024-04-05 VITALS
DIASTOLIC BLOOD PRESSURE: 76 MMHG | HEART RATE: 59 BPM | WEIGHT: 223 LBS | HEIGHT: 69 IN | SYSTOLIC BLOOD PRESSURE: 114 MMHG | OXYGEN SATURATION: 95 % | BODY MASS INDEX: 33.03 KG/M2

## 2024-04-05 DIAGNOSIS — I25.10 CORONARY ARTERY DISEASE INVOLVING NATIVE CORONARY ARTERY OF NATIVE HEART WITHOUT ANGINA PECTORIS: Primary | ICD-10-CM

## 2024-04-05 DIAGNOSIS — I21.4 NSTEMI, INITIAL EPISODE OF CARE: Primary | ICD-10-CM

## 2024-04-05 PROCEDURE — 93798 PHYS/QHP OP CAR RHAB W/ECG: CPT

## 2024-04-05 RX ORDER — PNV NO.95/FERROUS FUM/FOLIC AC 28MG-0.8MG
1000 TABLET ORAL DAILY
COMMUNITY

## 2024-04-08 ENCOUNTER — TREATMENT (OUTPATIENT)
Dept: CARDIAC REHAB | Facility: HOSPITAL | Age: 64
End: 2024-04-08
Payer: COMMERCIAL

## 2024-04-08 DIAGNOSIS — I21.4 NSTEMI, INITIAL EPISODE OF CARE: Primary | ICD-10-CM

## 2024-04-08 PROCEDURE — 93798 PHYS/QHP OP CAR RHAB W/ECG: CPT

## 2024-04-10 ENCOUNTER — TREATMENT (OUTPATIENT)
Dept: CARDIAC REHAB | Facility: HOSPITAL | Age: 64
End: 2024-04-10
Payer: COMMERCIAL

## 2024-04-10 DIAGNOSIS — I21.4 NSTEMI, INITIAL EPISODE OF CARE: Primary | ICD-10-CM

## 2024-04-10 PROCEDURE — 93798 PHYS/QHP OP CAR RHAB W/ECG: CPT

## 2024-04-12 ENCOUNTER — TREATMENT (OUTPATIENT)
Dept: CARDIAC REHAB | Facility: HOSPITAL | Age: 64
End: 2024-04-12
Payer: COMMERCIAL

## 2024-04-12 DIAGNOSIS — I21.4 NSTEMI, INITIAL EPISODE OF CARE: Primary | ICD-10-CM

## 2024-04-12 PROCEDURE — 93798 PHYS/QHP OP CAR RHAB W/ECG: CPT

## 2024-04-15 ENCOUNTER — TREATMENT (OUTPATIENT)
Dept: CARDIAC REHAB | Facility: HOSPITAL | Age: 64
End: 2024-04-15
Payer: COMMERCIAL

## 2024-04-15 DIAGNOSIS — I21.4 NSTEMI, INITIAL EPISODE OF CARE: Primary | ICD-10-CM

## 2024-04-15 PROCEDURE — 93798 PHYS/QHP OP CAR RHAB W/ECG: CPT

## 2024-04-17 ENCOUNTER — TREATMENT (OUTPATIENT)
Dept: CARDIAC REHAB | Facility: HOSPITAL | Age: 64
End: 2024-04-17
Payer: COMMERCIAL

## 2024-04-17 DIAGNOSIS — I21.4 NSTEMI, INITIAL EPISODE OF CARE: Primary | ICD-10-CM

## 2024-04-17 PROCEDURE — 93798 PHYS/QHP OP CAR RHAB W/ECG: CPT

## 2024-04-19 ENCOUNTER — TREATMENT (OUTPATIENT)
Dept: CARDIAC REHAB | Facility: HOSPITAL | Age: 64
End: 2024-04-19
Payer: COMMERCIAL

## 2024-04-19 DIAGNOSIS — I21.4 NSTEMI, INITIAL EPISODE OF CARE: Primary | ICD-10-CM

## 2024-04-19 PROCEDURE — 93798 PHYS/QHP OP CAR RHAB W/ECG: CPT

## 2024-04-22 ENCOUNTER — TREATMENT (OUTPATIENT)
Dept: CARDIAC REHAB | Facility: HOSPITAL | Age: 64
End: 2024-04-22
Payer: COMMERCIAL

## 2024-04-22 DIAGNOSIS — I21.4 NSTEMI, INITIAL EPISODE OF CARE: Primary | ICD-10-CM

## 2024-04-22 PROCEDURE — 93798 PHYS/QHP OP CAR RHAB W/ECG: CPT

## 2024-04-24 ENCOUNTER — TREATMENT (OUTPATIENT)
Dept: CARDIAC REHAB | Facility: HOSPITAL | Age: 64
End: 2024-04-24
Payer: COMMERCIAL

## 2024-04-24 DIAGNOSIS — I21.4 NSTEMI, INITIAL EPISODE OF CARE: Primary | ICD-10-CM

## 2024-04-24 PROCEDURE — 93798 PHYS/QHP OP CAR RHAB W/ECG: CPT

## 2024-04-26 ENCOUNTER — DOCUMENTATION (OUTPATIENT)
Dept: CARDIAC REHAB | Facility: HOSPITAL | Age: 64
End: 2024-04-26
Payer: COMMERCIAL

## 2024-04-26 NOTE — PROGRESS NOTES
Patient called to cancel todays cardiac rehab session due to having to paint his deck at home. Patient plans to return on 4/29/24.

## 2024-04-29 ENCOUNTER — TREATMENT (OUTPATIENT)
Dept: CARDIAC REHAB | Facility: HOSPITAL | Age: 64
End: 2024-04-29
Payer: COMMERCIAL

## 2024-04-29 DIAGNOSIS — I21.4 NSTEMI, INITIAL EPISODE OF CARE: Primary | ICD-10-CM

## 2024-04-29 PROCEDURE — 93798 PHYS/QHP OP CAR RHAB W/ECG: CPT

## 2024-05-01 ENCOUNTER — TREATMENT (OUTPATIENT)
Dept: CARDIAC REHAB | Facility: HOSPITAL | Age: 64
End: 2024-05-01
Payer: COMMERCIAL

## 2024-05-01 DIAGNOSIS — I21.4 NSTEMI, INITIAL EPISODE OF CARE: Primary | ICD-10-CM

## 2024-05-01 PROCEDURE — 93798 PHYS/QHP OP CAR RHAB W/ECG: CPT

## 2024-05-03 ENCOUNTER — TREATMENT (OUTPATIENT)
Dept: CARDIAC REHAB | Facility: HOSPITAL | Age: 64
End: 2024-05-03
Payer: COMMERCIAL

## 2024-05-03 DIAGNOSIS — I21.4 NSTEMI, INITIAL EPISODE OF CARE: Primary | ICD-10-CM

## 2024-05-03 PROCEDURE — 93798 PHYS/QHP OP CAR RHAB W/ECG: CPT

## 2024-05-03 NOTE — PROGRESS NOTES
Attended Phase II Cardiac Rehab. No medication or health history changes reported. See [facility] for details.  
No

## 2024-05-06 ENCOUNTER — TREATMENT (OUTPATIENT)
Dept: CARDIAC REHAB | Facility: HOSPITAL | Age: 64
End: 2024-05-06
Payer: COMMERCIAL

## 2024-05-06 DIAGNOSIS — I21.4 NSTEMI, INITIAL EPISODE OF CARE: Primary | ICD-10-CM

## 2024-05-06 PROCEDURE — 93798 PHYS/QHP OP CAR RHAB W/ECG: CPT

## 2024-05-08 ENCOUNTER — TREATMENT (OUTPATIENT)
Dept: CARDIAC REHAB | Facility: HOSPITAL | Age: 64
End: 2024-05-08
Payer: COMMERCIAL

## 2024-05-08 DIAGNOSIS — I21.4 NSTEMI, INITIAL EPISODE OF CARE: Primary | ICD-10-CM

## 2024-05-08 PROCEDURE — 93798 PHYS/QHP OP CAR RHAB W/ECG: CPT

## 2024-05-10 ENCOUNTER — TREATMENT (OUTPATIENT)
Dept: CARDIAC REHAB | Facility: HOSPITAL | Age: 64
End: 2024-05-10
Payer: COMMERCIAL

## 2024-05-10 DIAGNOSIS — I21.4 NSTEMI, INITIAL EPISODE OF CARE: Primary | ICD-10-CM

## 2024-05-10 PROCEDURE — 93798 PHYS/QHP OP CAR RHAB W/ECG: CPT

## 2024-05-13 ENCOUNTER — TREATMENT (OUTPATIENT)
Dept: CARDIAC REHAB | Facility: HOSPITAL | Age: 64
End: 2024-05-13
Payer: COMMERCIAL

## 2024-05-13 DIAGNOSIS — I21.4 NSTEMI, INITIAL EPISODE OF CARE: Primary | ICD-10-CM

## 2024-05-13 PROCEDURE — 93798 PHYS/QHP OP CAR RHAB W/ECG: CPT

## 2024-05-15 ENCOUNTER — TREATMENT (OUTPATIENT)
Dept: CARDIAC REHAB | Facility: HOSPITAL | Age: 64
End: 2024-05-15
Payer: COMMERCIAL

## 2024-05-15 DIAGNOSIS — I21.4 NSTEMI, INITIAL EPISODE OF CARE: Primary | ICD-10-CM

## 2024-05-15 PROCEDURE — 93798 PHYS/QHP OP CAR RHAB W/ECG: CPT

## 2024-05-17 ENCOUNTER — TREATMENT (OUTPATIENT)
Dept: CARDIAC REHAB | Facility: HOSPITAL | Age: 64
End: 2024-05-17
Payer: COMMERCIAL

## 2024-05-17 DIAGNOSIS — I21.4 NSTEMI, INITIAL EPISODE OF CARE: Primary | ICD-10-CM

## 2024-05-17 PROCEDURE — 93798 PHYS/QHP OP CAR RHAB W/ECG: CPT

## 2024-05-20 ENCOUNTER — TREATMENT (OUTPATIENT)
Dept: CARDIAC REHAB | Facility: HOSPITAL | Age: 64
End: 2024-05-20
Payer: COMMERCIAL

## 2024-05-20 DIAGNOSIS — I21.4 NSTEMI, INITIAL EPISODE OF CARE: Primary | ICD-10-CM

## 2024-05-20 PROCEDURE — 93798 PHYS/QHP OP CAR RHAB W/ECG: CPT

## 2024-05-24 ENCOUNTER — TREATMENT (OUTPATIENT)
Dept: CARDIAC REHAB | Facility: HOSPITAL | Age: 64
End: 2024-05-24
Payer: COMMERCIAL

## 2024-05-24 DIAGNOSIS — I21.4 NSTEMI, INITIAL EPISODE OF CARE: Primary | ICD-10-CM

## 2024-05-24 PROCEDURE — 93798 PHYS/QHP OP CAR RHAB W/ECG: CPT

## 2024-05-29 ENCOUNTER — TREATMENT (OUTPATIENT)
Dept: CARDIAC REHAB | Facility: HOSPITAL | Age: 64
End: 2024-05-29
Payer: COMMERCIAL

## 2024-05-29 DIAGNOSIS — I21.4 NSTEMI, INITIAL EPISODE OF CARE: Primary | ICD-10-CM

## 2024-05-29 PROCEDURE — 93798 PHYS/QHP OP CAR RHAB W/ECG: CPT

## 2024-05-31 ENCOUNTER — TREATMENT (OUTPATIENT)
Dept: CARDIAC REHAB | Facility: HOSPITAL | Age: 64
End: 2024-05-31
Payer: COMMERCIAL

## 2024-05-31 DIAGNOSIS — I21.4 NSTEMI, INITIAL EPISODE OF CARE: Primary | ICD-10-CM

## 2024-05-31 PROCEDURE — 93798 PHYS/QHP OP CAR RHAB W/ECG: CPT

## 2024-06-03 ENCOUNTER — TREATMENT (OUTPATIENT)
Dept: CARDIAC REHAB | Facility: HOSPITAL | Age: 64
End: 2024-06-03
Payer: COMMERCIAL

## 2024-06-03 DIAGNOSIS — I21.4 NSTEMI, INITIAL EPISODE OF CARE: Primary | ICD-10-CM

## 2024-06-03 PROCEDURE — 93798 PHYS/QHP OP CAR RHAB W/ECG: CPT

## 2024-06-05 ENCOUNTER — TREATMENT (OUTPATIENT)
Dept: CARDIAC REHAB | Facility: HOSPITAL | Age: 64
End: 2024-06-05
Payer: COMMERCIAL

## 2024-06-05 DIAGNOSIS — I21.4 NSTEMI, INITIAL EPISODE OF CARE: Primary | ICD-10-CM

## 2024-06-05 PROCEDURE — 93798 PHYS/QHP OP CAR RHAB W/ECG: CPT

## 2024-06-07 ENCOUNTER — TREATMENT (OUTPATIENT)
Dept: CARDIAC REHAB | Facility: HOSPITAL | Age: 64
End: 2024-06-07
Payer: COMMERCIAL

## 2024-06-07 DIAGNOSIS — I21.4 NSTEMI, INITIAL EPISODE OF CARE: Primary | ICD-10-CM

## 2024-06-07 PROCEDURE — 93798 PHYS/QHP OP CAR RHAB W/ECG: CPT

## 2024-06-07 NOTE — PROGRESS NOTES
Attended Phase II Cardiac Rehab. No medication or health history changes reported. See [facility] for details.    Patient discharged from Phase II program completing all 36 sessions.

## 2024-06-10 ENCOUNTER — APPOINTMENT (OUTPATIENT)
Dept: CARDIAC REHAB | Facility: HOSPITAL | Age: 64
End: 2024-06-10
Payer: COMMERCIAL

## 2024-06-12 ENCOUNTER — APPOINTMENT (OUTPATIENT)
Dept: CARDIAC REHAB | Facility: HOSPITAL | Age: 64
End: 2024-06-12
Payer: COMMERCIAL

## 2024-06-14 ENCOUNTER — APPOINTMENT (OUTPATIENT)
Dept: CARDIAC REHAB | Facility: HOSPITAL | Age: 64
End: 2024-06-14
Payer: COMMERCIAL

## 2024-06-17 ENCOUNTER — APPOINTMENT (OUTPATIENT)
Dept: CARDIAC REHAB | Facility: HOSPITAL | Age: 64
End: 2024-06-17
Payer: COMMERCIAL

## 2024-06-19 ENCOUNTER — APPOINTMENT (OUTPATIENT)
Dept: CARDIAC REHAB | Facility: HOSPITAL | Age: 64
End: 2024-06-19
Payer: COMMERCIAL

## 2024-06-21 ENCOUNTER — APPOINTMENT (OUTPATIENT)
Dept: CARDIAC REHAB | Facility: HOSPITAL | Age: 64
End: 2024-06-21
Payer: COMMERCIAL

## 2024-06-24 ENCOUNTER — APPOINTMENT (OUTPATIENT)
Dept: CARDIAC REHAB | Facility: HOSPITAL | Age: 64
End: 2024-06-24
Payer: COMMERCIAL

## 2024-06-26 ENCOUNTER — APPOINTMENT (OUTPATIENT)
Dept: CARDIAC REHAB | Facility: HOSPITAL | Age: 64
End: 2024-06-26
Payer: COMMERCIAL

## 2024-06-28 ENCOUNTER — APPOINTMENT (OUTPATIENT)
Dept: CARDIAC REHAB | Facility: HOSPITAL | Age: 64
End: 2024-06-28
Payer: COMMERCIAL

## 2024-10-11 NOTE — PROGRESS NOTES
Subjective:     Encounter Date:10/14/2024    Primary Care Physician: Liya Rubio APRN      Patient ID: Taqueria Madden is a 64 y.o. male.    Chief Complaint:Coronary artery disease involving native coronary    Problem List:  Coronary artery disease  2/2024 NSTEMI occluded proximal RCA with large thrombus burden treated with PTCA and penumbra with residual 50% large thrombotic lesion.  Normal left coronary anatomy essentially.  EF 55%.  2/21/2024 relook angiography reoccluded proximal RCA.  Normal left-sided system with new left to right collaterals.  Further intervention aborted as patient asymptomatic.  Hypertension  Dyslipidemia  Surgeries:  Eye surgery  Vasectomy      No Known Allergies      Current Outpatient Medications:     aspirin 81 MG EC tablet, Take 1 tablet by mouth Daily., Disp: , Rfl:     atorvastatin (LIPITOR) 40 MG tablet, Take 1 tablet by mouth Every Night., Disp: 90 tablet, Rfl: 3    bisoprolol (ZEBeta) 5 MG tablet, Take 0.5 tablets by mouth Daily., Disp: 45 tablet, Rfl: 3    Cholecalciferol 25 MCG (1000 UT) capsule, Take 1 capsule by mouth Daily., Disp: , Rfl:     Melatonin 10 MG tablet, Take 1 tablet by mouth Every Night., Disp: , Rfl:     ticagrelor (BRILINTA) 90 MG tablet tablet, Take 1 tablet by mouth Every 12 (Twelve) Hours., Disp: 180 tablet, Rfl: 3    Vitamin E 670 MG (1000 UT) capsule, Take 1,000 Units by mouth Daily., Disp: , Rfl:         History of Present Illness    Patient is a 64-year-old  male who presents today for 6-month follow-up of coronary artery disease.  Since last being seen he notes overall doing well from cardiac standpoint.  Denies any chest pain, pressure, tightness.  Denies any increasing shortness of breath.  No reported syncope, presyncope, or edema.  Does note some occasional shortness of breath after taking his Brilinta.  However, notes this only last for a few moments and then resolves.  He does not bothersome enough for him to switch medications.   "Has upcoming colonoscopy planned.    The following portions of the patient's history were reviewed and updated as appropriate: allergies, current medications, past family history, past medical history, past social history, past surgical history and problem list.      Social History     Tobacco Use    Smoking status: Former     Types: Cigarettes     Passive exposure: Past    Smokeless tobacco: Never   Vaping Use    Vaping status: Never Used   Substance Use Topics    Alcohol use: Yes     Comment: wine once a week    Drug use: Yes     Types: Marijuana     Comment: weekends         ROS       Objective:   /74 (BP Location: Left arm, Patient Position: Sitting)   Pulse 58   Ht 175.3 cm (69\")   Wt 98 kg (216 lb)   SpO2 98%   BMI 31.90 kg/m²         Vitals reviewed.   Constitutional:       Appearance: Well-developed and not in distress.   Neck:      Vascular: No JVD.      Trachea: No tracheal deviation.   Pulmonary:      Effort: Pulmonary effort is normal.      Breath sounds: Normal breath sounds.   Cardiovascular:      Normal rate. Regular rhythm.      Murmurs: There is no murmur.   Edema:     Peripheral edema absent.   Musculoskeletal:         General: No deformity. Neurological:      Mental Status: Alert and oriented to person, place, and time.         Procedures          Assessment:   Assessment & Plan      Diagnoses and all orders for this visit:    1. Coronary artery disease involving native coronary artery of native heart without angina pectoris (Primary), stable.  No angina.  Chronically occluded RCA.  On aspirin and Brilinta.    2. Hypertension, unspecified type, controlled.  On beta-blocker.    3. Dyslipidemia, well-controlled.  Most recent LDL less than 70.  On statin.      Plan:  Patient is overall stable from cardiac standpoint.  Continue current cardiac medications.  Encourage continued activity.  In regards to upcoming colonoscopy he may proceed at low CV risk and hold Brilinta for 5 " days.  Follow-up in 1 years time or sooner if needed.       Neelam SOTO           Dictated utilizing Dragon dictation

## 2024-10-14 ENCOUNTER — OFFICE VISIT (OUTPATIENT)
Dept: CARDIOLOGY | Facility: CLINIC | Age: 64
End: 2024-10-14
Payer: COMMERCIAL

## 2024-10-14 VITALS
HEIGHT: 69 IN | HEART RATE: 58 BPM | SYSTOLIC BLOOD PRESSURE: 122 MMHG | BODY MASS INDEX: 31.99 KG/M2 | OXYGEN SATURATION: 98 % | WEIGHT: 216 LBS | DIASTOLIC BLOOD PRESSURE: 74 MMHG

## 2024-10-14 DIAGNOSIS — I10 HYPERTENSION, UNSPECIFIED TYPE: ICD-10-CM

## 2024-10-14 DIAGNOSIS — E78.5 DYSLIPIDEMIA: ICD-10-CM

## 2024-10-14 DIAGNOSIS — I25.10 CORONARY ARTERY DISEASE INVOLVING NATIVE CORONARY ARTERY OF NATIVE HEART WITHOUT ANGINA PECTORIS: Primary | ICD-10-CM

## 2024-10-14 PROCEDURE — 99214 OFFICE O/P EST MOD 30 MIN: CPT | Performed by: NURSE PRACTITIONER

## 2024-10-14 NOTE — LETTER
October 14, 2024     BRITTANY Louis  [address]  [address]  [place] [address]    Patient: Taqueria Madden   YOB: 1960   Date of Visit: 10/14/2024     Dear BRITTANY Louis:       Thank you for referring Taqueria Madden to me for evaluation. Below are the relevant portions of my assessment and plan of care.    If you have questions, please do not hesitate to call me. I look forward to following Taqueria along with you.         Sincerely,        BRITTANY Hanna        CC: No Recipients    Neelam Sanders APRN  10/14/24 1507  Sign when Signing Visit  Subjective:     Encounter Date:10/14/2024    Primary Care Physician: Liya Rubio APRN      Patient ID: Taqueria Madden is a 64 y.o. male.    Chief Complaint:Coronary artery disease involving native coronary    Problem List:  Coronary artery disease  2/2024 NSTEMI occluded proximal RCA with large thrombus burden treated with PTCA and penumbra with residual 50% large thrombotic lesion.  Normal left coronary anatomy essentially.  EF 55%.  2/21/2024 relook angiography reoccluded proximal RCA.  Normal left-sided system with new left to right collaterals.  Further intervention aborted as patient asymptomatic.  Hypertension  Dyslipidemia  Surgeries:  Eye surgery  Vasectomy      No Known Allergies      Current Outpatient Medications:   •  aspirin 81 MG EC tablet, Take 1 tablet by mouth Daily., Disp: , Rfl:   •  atorvastatin (LIPITOR) 40 MG tablet, Take 1 tablet by mouth Every Night., Disp: 90 tablet, Rfl: 3  •  bisoprolol (ZEBeta) 5 MG tablet, Take 0.5 tablets by mouth Daily., Disp: 45 tablet, Rfl: 3  •  Cholecalciferol 25 MCG (1000 UT) capsule, Take 1 capsule by mouth Daily., Disp: , Rfl:   •  Melatonin 10 MG tablet, Take 1 tablet by mouth Every Night., Disp: , Rfl:   •  ticagrelor (BRILINTA) 90 MG tablet tablet, Take 1 tablet by mouth Every 12 (Twelve) Hours., Disp: 180 tablet, Rfl: 3  •  Vitamin E 670 MG (1000 UT) capsule, Take  "1,000 Units by mouth Daily., Disp: , Rfl:         History of Present Illness    Patient is a 64-year-old  male who presents today for 6-month follow-up of coronary artery disease.  Since last being seen he notes overall doing well from cardiac standpoint.  Denies any chest pain, pressure, tightness.  Denies any increasing shortness of breath.  No reported syncope, presyncope, or edema.  Does note some occasional shortness of breath after taking his Brilinta.  However, notes this only last for a few moments and then resolves.  He does not bothersome enough for him to switch medications.  Has upcoming colonoscopy planned.    The following portions of the patient's history were reviewed and updated as appropriate: allergies, current medications, past family history, past medical history, past social history, past surgical history and problem list.      Social History     Tobacco Use   • Smoking status: Former     Types: Cigarettes     Passive exposure: Past   • Smokeless tobacco: Never   Vaping Use   • Vaping status: Never Used   Substance Use Topics   • Alcohol use: Yes     Comment: wine once a week   • Drug use: Yes     Types: Marijuana     Comment: weekends         ROS       Objective:   /74 (BP Location: Left arm, Patient Position: Sitting)   Pulse 58   Ht 175.3 cm (69\")   Wt 98 kg (216 lb)   SpO2 98%   BMI 31.90 kg/m²         Vitals reviewed.   Constitutional:       Appearance: Well-developed and not in distress.   Neck:      Vascular: No JVD.      Trachea: No tracheal deviation.   Pulmonary:      Effort: Pulmonary effort is normal.      Breath sounds: Normal breath sounds.   Cardiovascular:      Normal rate. Regular rhythm.      Murmurs: There is no murmur.   Edema:     Peripheral edema absent.   Musculoskeletal:         General: No deformity. Neurological:      Mental Status: Alert and oriented to person, place, and time.         Procedures          Assessment:   Assessment & Plan "     Diagnoses and all orders for this visit:    1. Coronary artery disease involving native coronary artery of native heart without angina pectoris (Primary), stable.  No angina.  Chronically occluded RCA.  On aspirin and Brilinta.    2. Hypertension, unspecified type, controlled.  On beta-blocker.    3. Dyslipidemia, well-controlled.  Most recent LDL less than 70.  On statin.      Plan:  Patient is overall stable from cardiac standpoint.  Continue current cardiac medications.  Encourage continued activity.  In regards to upcoming colonoscopy he may proceed at low CV risk and hold Brilinta for 5 days.  Follow-up in 1 years time or sooner if needed.       Neelam SOTO           Dictated utilizing Dragon dictation

## 2025-02-03 RX ORDER — ATORVASTATIN CALCIUM 40 MG/1
40 TABLET, FILM COATED ORAL NIGHTLY
Qty: 90 TABLET | Refills: 3 | Status: SHIPPED | OUTPATIENT
Start: 2025-02-03

## 2025-02-03 RX ORDER — BISOPROLOL FUMARATE 5 MG/1
2.5 TABLET, FILM COATED ORAL
Qty: 45 TABLET | Refills: 3 | Status: SHIPPED | OUTPATIENT
Start: 2025-02-03

## 2025-02-03 NOTE — TELEPHONE ENCOUNTER
Lab Results   Component Value Date    CHOL 120 02/21/2024    TRIG 85 02/21/2024    HDL 28 (L) 02/21/2024    LDL 75 02/21/2024     Lab Results   Component Value Date    GLUCOSE 98 02/22/2024    BUN 12 02/22/2024    CREATININE 0.76 02/22/2024     02/22/2024    K 4.0 02/22/2024     02/22/2024    CALCIUM 8.7 02/22/2024    PROTEINTOT 7.9 02/19/2024    ALBUMIN 4.5 02/19/2024    ALT 35 02/19/2024    AST 40 02/19/2024    ALKPHOS 81 02/19/2024    BILITOT 1.7 (H) 02/19/2024    GLOB 3.4 02/19/2024    AGRATIO 1.3 02/19/2024    BCR 15.8 02/22/2024    ANIONGAP 12.0 02/22/2024    EGFR 101.0 02/22/2024

## 2025-02-03 NOTE — TELEPHONE ENCOUNTER
Caller: Taqueria Madden    Relationship: Self    Best call back number: [phone]    Requested Prescriptions:   Requested Prescriptions     Pending Prescriptions Disp Refills    bisoprolol (ZEBeta) 5 MG tablet 45 tablet 3     Sig: Take 0.5 tablets by mouth Daily.    atorvastatin (LIPITOR) 40 MG tablet 90 tablet 3     Sig: Take 1 tablet by mouth Every Night.        Pharmacy where request should be sent: [id number] - [place], [place] - [address] [facility] [address] [facility] [phone] [facility] [phone]      Last office visit with prescribing clinician: 4/5/2024   Last telemedicine visit with prescribing clinician: Visit date not found   Next office visit with prescribing clinician: 10/27/2025     Additional details provided by patient:     Does the patient have less than a 3 day supply:  [x] Yes  [] No    Would you like a call back once the refill request has been completed: [x] Yes [] No    If the office needs to give you a call back, can they leave a voicemail: [x] Yes [] No    Alyssa Ceballos Rep   02/03/25 14:17 EST

## 2025-06-24 RX ORDER — TICAGRELOR 90 MG/1
90 TABLET, FILM COATED ORAL EVERY 12 HOURS SCHEDULED
Qty: 180 TABLET | Refills: 3 | Status: SHIPPED | OUTPATIENT
Start: 2025-06-24

## (undated) DEVICE — COPILOT BLEEDBACK CONTROL VALVE: Brand: COPILOT

## (undated) DEVICE — NDL ANGIOGR ADV THN SMOTH SGLWALL 21G 1.5

## (undated) DEVICE — ST EXT IV SMRTSTE 2VLV FIX M LL 6ML 41

## (undated) DEVICE — TR BAND RADIAL ARTERY COMPRESSION DEVICE: Brand: TR BAND

## (undated) DEVICE — DEV COMP RAD PRELUDESYNC 24CM

## (undated) DEVICE — HI-TORQUE VERSATURN F GUIDE WIRE FULLY COATED .014 STRAIGHT TIP 190 CM: Brand: HI-TORQUE VERSATURN

## (undated) DEVICE — DEV INFL MONARCH 25W

## (undated) DEVICE — KT CATH INDIGO RX ASP 140CM

## (undated) DEVICE — GLIDESHEATH BASIC HYDROPHILIC COATED INTRODUCER SHEATH: Brand: GLIDESHEATH

## (undated) DEVICE — TREK CORONARY DILATATION CATHETER 2.50 MM X 15 MM / RAPID-EXCHANGE: Brand: TREK

## (undated) DEVICE — MODEL AT P65, P/N 701554-001KIT CONTENTS: HAND CONTROLLER, 3-WAY HIGH-PRESSURE STOPCOCK WITH ROTATING END AND PREMIUM HIGH-PRESSURE TUBING: Brand: ANGIOTOUCH® KIT

## (undated) DEVICE — ADULT, W/LG. BACK PAD, RADIOTRANSPARENT ELEMENT AND LEAD WIRE COMPATIBLE W/: Brand: DEFIBRILLATION ELECTRODES

## (undated) DEVICE — GLIDESHEATH SLENDER STAINLESS STEEL KIT: Brand: GLIDESHEATH SLENDER

## (undated) DEVICE — GW PERIPH GUIDERIGHT STD/EXCHNG/J/TIP SS 0.035IN 5X260CM

## (undated) DEVICE — KT VLV HEMO MAP ACC PLS LG/BORE MTL/INTRO W/TORQ/DEV

## (undated) DEVICE — CATH DIAG EXPO .045 FL3  5F 100CM

## (undated) DEVICE — CATH DIAG EXPO .056 FL3.5 6F 100CM

## (undated) DEVICE — ST INF PRI SMRTSTE 20DRP 2VLV 24ML 117

## (undated) DEVICE — MODEL BT2000 P/N 700287-012KIT CONTENTS: MANIFOLD WITH SALINE AND CONTRAST PORTS, SALINE TUBING WITH SPIKE AND HAND SYRINGE, TRANSDUCER: Brand: BT2000 AUTOMATED MANIFOLD KIT

## (undated) DEVICE — PK CATH CARD 10

## (undated) DEVICE — CATH DIAG EXPO M/ PK 6FR FL4/FR4 PIG 3PK

## (undated) DEVICE — CVR PROB ULTRASND/TRANSD W/GEL 7X11IN STRL

## (undated) DEVICE — GUIDE CATHETER: Brand: MACH1™

## (undated) DEVICE — CANSTR COL ENGINE FOR INDIGO SYS